# Patient Record
Sex: FEMALE | Race: WHITE | NOT HISPANIC OR LATINO | Employment: FULL TIME | URBAN - METROPOLITAN AREA
[De-identification: names, ages, dates, MRNs, and addresses within clinical notes are randomized per-mention and may not be internally consistent; named-entity substitution may affect disease eponyms.]

---

## 2017-12-02 ENCOUNTER — APPOINTMENT (OUTPATIENT)
Dept: LAB | Facility: HOSPITAL | Age: 37
End: 2017-12-02
Attending: OBSTETRICS & GYNECOLOGY
Payer: COMMERCIAL

## 2017-12-02 ENCOUNTER — TRANSCRIBE ORDERS (OUTPATIENT)
Dept: LAB | Facility: HOSPITAL | Age: 37
End: 2017-12-02

## 2017-12-02 DIAGNOSIS — N92.6 IRREGULAR MENSTRUAL CYCLE: Primary | ICD-10-CM

## 2017-12-02 DIAGNOSIS — N92.6 IRREGULAR MENSTRUAL CYCLE: ICD-10-CM

## 2017-12-02 LAB
B-HCG SERPL-ACNC: <2 MIU/ML
BASOPHILS # BLD AUTO: 0.03 THOUSANDS/ΜL (ref 0–0.1)
BASOPHILS NFR BLD AUTO: 0 % (ref 0–1)
EOSINOPHIL # BLD AUTO: 0.13 THOUSAND/ΜL (ref 0–0.61)
EOSINOPHIL NFR BLD AUTO: 2 % (ref 0–6)
ERYTHROCYTE [DISTWIDTH] IN BLOOD BY AUTOMATED COUNT: 13 % (ref 11.6–15.1)
HCT VFR BLD AUTO: 37.5 % (ref 34.8–46.1)
HGB BLD-MCNC: 12.5 G/DL (ref 11.5–15.4)
LYMPHOCYTES # BLD AUTO: 2.22 THOUSANDS/ΜL (ref 0.6–4.47)
LYMPHOCYTES NFR BLD AUTO: 32 % (ref 14–44)
MCH RBC QN AUTO: 30.3 PG (ref 26.8–34.3)
MCHC RBC AUTO-ENTMCNC: 33.3 G/DL (ref 31.4–37.4)
MCV RBC AUTO: 91 FL (ref 82–98)
MONOCYTES # BLD AUTO: 0.59 THOUSAND/ΜL (ref 0.17–1.22)
MONOCYTES NFR BLD AUTO: 9 % (ref 4–12)
NEUTROPHILS # BLD AUTO: 3.92 THOUSANDS/ΜL (ref 1.85–7.62)
NEUTS SEG NFR BLD AUTO: 57 % (ref 43–75)
NRBC BLD AUTO-RTO: 0 /100 WBCS
PLATELET # BLD AUTO: 235 THOUSANDS/UL (ref 149–390)
PMV BLD AUTO: 10.7 FL (ref 8.9–12.7)
RBC # BLD AUTO: 4.12 MILLION/UL (ref 3.81–5.12)
TSH SERPL DL<=0.05 MIU/L-ACNC: 4.06 UIU/ML (ref 0.36–3.74)
WBC # BLD AUTO: 6.9 THOUSAND/UL (ref 4.31–10.16)

## 2017-12-02 PROCEDURE — 84443 ASSAY THYROID STIM HORMONE: CPT

## 2017-12-02 PROCEDURE — 85025 COMPLETE CBC W/AUTO DIFF WBC: CPT

## 2017-12-02 PROCEDURE — 84702 CHORIONIC GONADOTROPIN TEST: CPT

## 2017-12-02 PROCEDURE — 36415 COLL VENOUS BLD VENIPUNCTURE: CPT

## 2017-12-20 ENCOUNTER — ALLSCRIPTS OFFICE VISIT (OUTPATIENT)
Dept: OTHER | Facility: OTHER | Age: 37
End: 2017-12-20

## 2017-12-20 DIAGNOSIS — R94.6 ABNORMAL RESULTS OF THYROID FUNCTION STUDIES: ICD-10-CM

## 2017-12-20 DIAGNOSIS — N92.6 IRREGULAR MENSTRUATION: ICD-10-CM

## 2017-12-20 DIAGNOSIS — R53.83 OTHER FATIGUE: ICD-10-CM

## 2017-12-21 NOTE — PROGRESS NOTES
Assessment   1  Elevated TSH (794 5) (R94 6)   2  Irregular periods (626 4) (N92 6)   3  Fatigue (780 79) (R53 83)   4  Palpitations (785 1) (R00 2)    Plan   Elevated TSH, Fatigue, Irregular periods    · (1) COMPREHENSIVE METABOLIC PANEL; Status:Active; Requested for:15Bdf8466;    · (1) T4, FREE; Status:Active; Requested for:56Kkm1937;    · (1) TSH; Status:Active; Requested for:55Vcm2871;    · Follow-up PRN Evaluation and Treatment  Follow-up  Status: Complete  Done:    [id number]    Discussion/Summary      Recheck labs as directed and check cmp and TSH and T4 free for recheck of possible hypothyroidism with complaints of fatigue and also palpitations and also recent irregular period  Avoid caffeine for hx of some palpitations previously, none today or recently  Call if any problems  If TSH still elevated and continues with same symptoms will start Levothyroxine  Await labs due for 1st week of January 2018  The patient was counseled regarding  The treatment plan was reviewed with the patient/guardian  The patient/guardian understands and agrees with the treatment plan      Chief Complaint   Pt here to review TSH blood work  No other concerns  History of Present Illness   Mother recently diagnosed with hypothyroidism  No weight gain or constipation but has had fatigue and also cold hands and also irregular periods  Did get her period in December, 2018  pregnancy test negative  Did see her GYN and ordered TSH which was borderline elevated  No other complaints  Has had some palpitations also recently  Does drink coffee  She also noted some low back pain for 1 1/2 weeks which resolved recently  Review of Systems        Constitutional: as noted in HPI  Eyes: No complaints of eye pain, no red eyes, no eyesight problems, no discharge, no dry eyes, no itching of eyes  ENT: no complaints of earache, no loss of hearing, no nose bleeds, no nasal discharge, no sore throat, no hoarseness  Cardiovascular: as noted in HPI  Respiratory: No complaints of shortness of breath, no wheezing, no cough, no SOB on exertion, no orthopnea, no PND  Gastrointestinal: No complaints of abdominal pain, no constipation, no nausea or vomiting, no diarrhea, no bloody stools  Genitourinary: as noted in HPI  Musculoskeletal: No complaints of arthralgias, no myalgias, no joint swelling or stiffness, no limb pain or swelling  Integumentary: No complaints of skin rash or lesions, no itching, no skin wounds, no breast pain or lump  Neurological: No complaints of headache, no confusion, no convulsions, no numbness, no dizziness or fainting, no tingling, no limb weakness, no difficulty walking  Psychiatric: Not suicidal, no sleep disturbance, no anxiety or depression, no change in personality, no emotional problems  Endocrine: as noted in HPI  Hematologic/Lymphatic: No complaints of swollen glands, no swollen glands in the neck, does not bleed easily, does not bruise easily  Active Problems   1  Chronic Common Migraine (Without Aura) With Status Migrainosus (346 72)   2  Depression (311) (F32 9)   3  Dysfunction of left eustachian tube (381 81) (H69 82)   4  Dysfunction of right eustachian tube (381 81) (H69 81)   5  Esophageal reflux (530 81) (K21 9)   6  History of allergy (V15 09) (Z88 9)   7  Migraine (346 90) (G43 909)   8  Nausea and vomiting (787 01) (R11 2)   9  Nonspecific reaction to tuberculin skin test without active tuberculosis (795 51) (R76 11)   10  Sinusitis (473 9) (J32 9)    Past Medical History   1  History of eczema (V13 3) (Z87 2)    Surgical History   1  History of Appendectomy   2  History of Primary Repair Of Knee Ligament Cruciate Anterior   3  History of Tonsillectomy    Social History    · Denied: History of Alcohol Use (History)   · Denied: History of Drug Use   · Never A Smoker    Current Meds    1   Butalbital-APAP-Caffeine -40 MG Oral Tablet; TAKE 1 TABLET Daily 1 po qd prn     headaches; Therapy: 35GPT6820 to (Evaluate:40Fmn5418)  Requested for: 14PZJ3127; Last     YW:28AHF5981 Ordered   2  Promethazine HCl - 25 MG Oral Tablet; TAKE 1 TABLET Daily PRN nausea,vomiting; Therapy: 85FXH9136 to (Evaluate:17Jun2016)  Requested for: 21LXL3254; Last     Rx:02Jun2016 Ordered    Allergies   1  No Known Drug Allergies    Vitals   Vital Signs    Recorded: 99VBK7819 22:45BS   Systolic 949   Diastolic 68   Height 5 ft 8 in   Weight 161 lb 4 oz   BMI Calculated 24 52   BSA Calculated 1 87     Physical Exam        Constitutional      General appearance: Abnormal  -- fatigue  Eyes      Conjunctiva and lids: No swelling, erythema or discharge  Pupils and irises: Equal, round and reactive to light  Ears, Nose, Mouth, and Throat      External inspection of ears and nose: Normal        Otoscopic examination: Tympanic membranes translucent with normal light reflex  Canals patent without erythema  Nasal mucosa, septum, and turbinates: Normal without edema or erythema  Oropharynx: Normal with no erythema, edema, exudate or lesions  Pulmonary      Respiratory effort: No increased work of breathing or signs of respiratory distress  Auscultation of lungs: Clear to auscultation  Cardiovascular      Palpation of heart: Normal PMI, no thrills  Auscultation of heart: Normal rate and rhythm, normal S1 and S2, without murmurs  Examination of extremities for edema and/or varicosities: Normal        Carotid pulses: Normal        Lymphatic      Palpation of lymph nodes in neck: No lymphadenopathy  Musculoskeletal      Gait and station: Normal        Digits and nails: Normal without clubbing or cyanosis  Inspection/palpation of joints, bones, and muscles: Normal        Skin      Skin and subcutaneous tissue: Normal without rashes or lesions  Neurologic      Cranial nerves: Cranial nerves 2-12 intact  Reflexes: 2+ and symmetric  Sensation: No sensory loss  Psychiatric      Orientation to person, place, and time: Normal        Mood and affect: Normal        Additional Exam:  no goiter, no exophthalmos, no edema           Signatures    Electronically signed by : Heath Swann DO; Dec 20 2017  9:45AM EST                       (Author)

## 2018-01-15 NOTE — RESULT NOTES
Message   MRI brain wnl  Verified Results  * MRI BRAIN WO CONTRAST [id number] 06:25AM [facility] Order Number: [id number]     Test Name Result Flag Reference   MRI BRAIN WO CONTRAST (Report)     MRI BRAIN WITHOUT CONTRAST     INDICATION: Migraine headaches for years becoming more frequent, approximately every 2 weeks  COMPARISON:  Prior MRI June 26, 2006     TECHNIQUE: Sagittal T1, axial T2, axial FLAIR, axial T1, axial Gradient and axial diffusion imaging  Axial bravo  IMAGE QUALITY: Diagnostic  FINDINGS:     BRAIN PARENCHYMA: There is no discrete mass, mass effect or midline shift  No abnormal white matter signal identified  Brainstem and cerebellum demonstrate normal signal  There is no intracranial hemorrhage  There is no evidence of acute infarction and   diffusion imaging is unremarkable  VENTRICLES: The ventricles are normal in size and contour  SELLA AND PITUITARY GLAND: Normal      ORBITS: Normal      PARANASAL SINUSES: Normal      VASCULATURE: Evaluation of the major intracranial vasculature demonstrates appropriate flow voids  CALVARIUM AND SKULL BASE: Normal      EXTRACRANIAL SOFT TISSUES: Normal        IMPRESSION:     Normal MRI of the brain         Workstation performed: [id number]     Signed by:   Javier Allen DO   6/9/16

## 2018-01-20 ENCOUNTER — TRANSCRIBE ORDERS (OUTPATIENT)
Dept: LAB | Facility: HOSPITAL | Age: 38
End: 2018-01-20

## 2018-01-20 ENCOUNTER — APPOINTMENT (OUTPATIENT)
Dept: LAB | Facility: HOSPITAL | Age: 38
End: 2018-01-20
Payer: COMMERCIAL

## 2018-01-20 DIAGNOSIS — R94.6 ABNORMAL RESULTS OF THYROID FUNCTION STUDIES: ICD-10-CM

## 2018-01-20 DIAGNOSIS — N92.6 IRREGULAR MENSTRUATION: ICD-10-CM

## 2018-01-20 DIAGNOSIS — R53.83 OTHER FATIGUE: ICD-10-CM

## 2018-01-20 LAB
ALBUMIN SERPL BCP-MCNC: 4.2 G/DL (ref 3.5–5)
ALP SERPL-CCNC: 58 U/L (ref 46–116)
ALT SERPL W P-5'-P-CCNC: 21 U/L (ref 12–78)
ANION GAP SERPL CALCULATED.3IONS-SCNC: 7 MMOL/L (ref 4–13)
AST SERPL W P-5'-P-CCNC: 17 U/L (ref 5–45)
BILIRUB SERPL-MCNC: 0.56 MG/DL (ref 0.2–1)
BUN SERPL-MCNC: 8 MG/DL (ref 5–25)
CALCIUM SERPL-MCNC: 8.9 MG/DL (ref 8.3–10.1)
CHLORIDE SERPL-SCNC: 105 MMOL/L (ref 100–108)
CO2 SERPL-SCNC: 26 MMOL/L (ref 21–32)
CREAT SERPL-MCNC: 0.79 MG/DL (ref 0.6–1.3)
GFR SERPL CREATININE-BSD FRML MDRD: 96 ML/MIN/1.73SQ M
GLUCOSE SERPL-MCNC: 90 MG/DL (ref 65–140)
POTASSIUM SERPL-SCNC: 3.9 MMOL/L (ref 3.5–5.3)
PROT SERPL-MCNC: 7.3 G/DL (ref 6.4–8.2)
SODIUM SERPL-SCNC: 138 MMOL/L (ref 136–145)
T4 FREE SERPL-MCNC: 1.01 NG/DL (ref 0.76–1.46)
TSH SERPL DL<=0.05 MIU/L-ACNC: 3.52 UIU/ML (ref 0.36–3.74)

## 2018-01-20 PROCEDURE — 84439 ASSAY OF FREE THYROXINE: CPT

## 2018-01-20 PROCEDURE — 36415 COLL VENOUS BLD VENIPUNCTURE: CPT

## 2018-01-20 PROCEDURE — 80053 COMPREHEN METABOLIC PANEL: CPT

## 2018-01-20 PROCEDURE — 84443 ASSAY THYROID STIM HORMONE: CPT

## 2018-01-21 ENCOUNTER — GENERIC CONVERSION - ENCOUNTER (OUTPATIENT)
Dept: OTHER | Facility: OTHER | Age: 38
End: 2018-01-21

## 2018-01-23 VITALS
BODY MASS INDEX: 24.44 KG/M2 | DIASTOLIC BLOOD PRESSURE: 68 MMHG | WEIGHT: 161.25 LBS | SYSTOLIC BLOOD PRESSURE: 118 MMHG | HEIGHT: 68 IN

## 2018-01-24 NOTE — RESULT NOTES
Verified Results  (1) TSH 20Jan2018 07:40AM [facility] Order Number: [id number]     Test Name Result Flag Reference   TSH 3 520 uIU/mL  0 358-3 740   Patients undergoing fluorescein dye angiography may retain small amounts of fluorescein in the body for 48-72 hours post procedure  Samples containing fluorescein can produce falsely depressed TSH values  If the patient had this procedure,a specimen should be resubmitted post fluorescein clearance  The recommended reference ranges for TSH during pregnancy are as follows:  First trimester 0 1 to 2 5 uIU/mL  Second trimester  0 2 to 3 0 uIU/mL  Third trimester 0 3 to 3 0 uIU/m     (1) T4, FREE 20Jan2018 07:40AM [facility] Order Number: [id number]     Test Name Result Flag Reference   T4,FREE 1 01 ng/dL  0 76-1 46   Specimen collection should occur prior to Sulfasalazine administration due to the potential for falsely elevated results  (1) COMPREHENSIVE METABOLIC PANEL 38VUY0663 87:86DG [facility] Order Number: [id number]     Test Name Result Flag Reference   GLUCOSE,RANDM 90 mg/dL     If the patient is fasting, the ADA then defines impaired fasting glucose as > 100 mg/dL and diabetes as > or equal to 123 mg/dL  Specimen collection should occur prior to Sulfasalazine administration due to the potential for falsely depressed results  Specimen collection should occur prior to Sulfapyridine administration due to the potential for falsely elevated results     SODIUM 138 mmol/L  136-145   POTASSIUM 3 9 mmol/L  3 5-5 3   CHLORIDE 105 mmol/L  100-108   CARBON DIOXIDE 26 mmol/L  21-32   ANION GAP (CALC) 7 mmol/L  4-13   BLOOD UREA NITROGEN 8 mg/dL  5-25   CREATININE 0 79 mg/dL  0 60-1 30   Standardized to IDMS reference method   CALCIUM 8 9 mg/dL  8 3-10 1   BILI, TOTAL 0 56 mg/dL  0 20-1 00   ALK PHOSPHATAS 58 U/L     ALT (SGPT) 21 U/L  12-78   Specimen collection should occur prior to Sulfasalazine and/or Sulfapyridine administration due to the potential for falsely depressed results  AST(SGOT) 17 U/L  5-45   Specimen collection should occur prior to Sulfasalazine administration due to the potential for falsely depressed results  ALBUMIN 4 2 g/dL  3 5-5 0   TOTAL PROTEIN 7 3 g/dL  6 4-8 2   eGFR 96 ml/min/1 73sq m     [facility] Disease Education Program recommendations are as follows:  GFR calculation is accurate only with a steady state creatinine  Chronic Kidney disease less than 60 ml/min/1 73 sq  meters  Kidney failure less than 15 ml/min/1 73 sq  meters

## 2018-06-19 ENCOUNTER — OFFICE VISIT (OUTPATIENT)
Dept: FAMILY MEDICINE CLINIC | Facility: CLINIC | Age: 38
End: 2018-06-19
Payer: COMMERCIAL

## 2018-06-19 VITALS
SYSTOLIC BLOOD PRESSURE: 100 MMHG | WEIGHT: 157.4 LBS | DIASTOLIC BLOOD PRESSURE: 64 MMHG | HEIGHT: 68 IN | TEMPERATURE: 98.4 F | BODY MASS INDEX: 23.86 KG/M2

## 2018-06-19 DIAGNOSIS — J32.9 SINUSITIS, UNSPECIFIED CHRONICITY, UNSPECIFIED LOCATION: Primary | ICD-10-CM

## 2018-06-19 DIAGNOSIS — R05.9 COUGH: ICD-10-CM

## 2018-06-19 PROCEDURE — 3008F BODY MASS INDEX DOCD: CPT | Performed by: FAMILY MEDICINE

## 2018-06-19 PROCEDURE — 1036F TOBACCO NON-USER: CPT | Performed by: FAMILY MEDICINE

## 2018-06-19 PROCEDURE — 99213 OFFICE O/P EST LOW 20 MIN: CPT | Performed by: FAMILY MEDICINE

## 2018-06-19 RX ORDER — FLUTICASONE PROPIONATE 50 MCG
2 SPRAY, SUSPENSION (ML) NASAL DAILY
Qty: 16 G | Refills: 1 | Status: SHIPPED | OUTPATIENT
Start: 2018-06-19 | End: 2020-03-09

## 2018-06-19 RX ORDER — MULTIVITAMIN
1 TABLET ORAL DAILY
COMMUNITY

## 2018-06-19 RX ORDER — LANOLIN ALCOHOL/MO/W.PET/CERES
CREAM (GRAM) TOPICAL DAILY
COMMUNITY
End: 2020-03-09

## 2018-06-19 RX ORDER — PREDNISONE 10 MG/1
TABLET ORAL
Qty: 21 TABLET | Refills: 0 | Status: SHIPPED | OUTPATIENT
Start: 2018-06-19 | End: 2020-03-09

## 2018-06-19 RX ORDER — AZITHROMYCIN 250 MG/1
TABLET, FILM COATED ORAL
Qty: 6 TABLET | Refills: 0 | Status: SHIPPED | OUTPATIENT
Start: 2018-06-19 | End: 2018-06-24

## 2018-06-19 NOTE — PROGRESS NOTES
Assessment/Plan:  Chief Complaint   Patient presents with    Nasal Congestion     had symptoms for about 1 week now    Earache    Sinusitis    Cough     Patient Instructions   Rest and fluids, start abx and prednisone as directed and nasal steroid and call if worse  May use Dimetapp DM cold and cough prn cough  No problem-specific Assessment & Plan notes found for this encounter  Diagnoses and all orders for this visit:    Sinusitis, unspecified chronicity, unspecified location  -     fluticasone (FLONASE) 50 mcg/act nasal spray; 2 sprays into each nostril daily  -     predniSONE 10 mg tablet; Take 60 mg po day#1, 50 mg po day#2, 40 mg po day#3, 30 mg po day#4, 20 mg po day#5m and 10 mg po day#6    Cough  -     azithromycin (ZITHROMAX) 250 mg tablet; Take 2 tablets today then 1 tablet daily x 4 days  -     predniSONE 10 mg tablet; Take 60 mg po day#1, 50 mg po day#2, 40 mg po day#3, 30 mg po day#4, 20 mg po day#5m and 10 mg po day#6    Other orders  -     cyanocobalamin (VITAMIN B-12) 1,000 mcg tablet; Take by mouth daily  -     Multiple Vitamin (MULTIVITAMIN) tablet; Take 1 tablet by mouth daily  -     Calcium Carb-Cholecalciferol (CALCIUM 500+D PO); Take by mouth          Subjective:      Patient ID: Frank Alvares is a 40 y o  female  Nasal Congestion (had symptoms for about 1 week now)  Earache   Sinusitis   Cough     Here for 1 week duration of symptoms, took Tylenol prn pain and motrin also and used cold and sinus OTC med  No fever or chills  The following portions of the patient's history were reviewed and updated as appropriate: allergies, current medications, past family history, past medical history, past social history, past surgical history and problem list     Review of Systems   Constitutional: Negative  HENT: Positive for congestion, ear pain, sinus pain and sinus pressure  Eyes: Negative  Respiratory: Positive for cough  Cardiovascular: Negative  Gastrointestinal: Negative  Endocrine: Negative  Genitourinary: Negative  Musculoskeletal: Negative  Skin: Negative  Allergic/Immunologic: Negative  Neurological: Negative  Hematological: Negative  Psychiatric/Behavioral: Negative  Objective:      /64 (BP Location: Left arm, Patient Position: Sitting, Cuff Size: Standard)   Temp 98 4 °F (36 9 °C) (Tympanic)   Ht 5' 8" (1 727 m)   Wt 71 4 kg (157 lb 6 4 oz)   BMI 23 93 kg/m²          Physical Exam   Constitutional: She is oriented to person, place, and time  She appears well-developed and well-nourished  HENT:   Head: Normocephalic and atraumatic  Right Ear: External ear normal    Left Ear: External ear normal    Pnd, sinusitis, nasal congestion   Eyes: Conjunctivae and EOM are normal  Pupils are equal, round, and reactive to light  Neck: Normal range of motion  Neck supple  Cardiovascular: Normal rate, regular rhythm, normal heart sounds and intact distal pulses  Pulmonary/Chest: Effort normal and breath sounds normal    cough   Musculoskeletal: Normal range of motion  Neurological: She is alert and oriented to person, place, and time  She has normal reflexes  Skin: Skin is warm and dry  Psychiatric: She has a normal mood and affect   Her behavior is normal

## 2018-06-19 NOTE — PATIENT INSTRUCTIONS
Rest and fluids, start abx and prednisone as directed and nasal steroid and call if worse  May use Dimetapp DM cold and cough prn cough

## 2019-02-26 ENCOUNTER — CONSULT (OUTPATIENT)
Dept: SURGICAL ONCOLOGY | Facility: CLINIC | Age: 39
End: 2019-02-26
Payer: COMMERCIAL

## 2019-02-26 VITALS
DIASTOLIC BLOOD PRESSURE: 70 MMHG | HEART RATE: 72 BPM | WEIGHT: 168 LBS | HEIGHT: 68 IN | TEMPERATURE: 96.5 F | BODY MASS INDEX: 25.46 KG/M2 | SYSTOLIC BLOOD PRESSURE: 110 MMHG

## 2019-02-26 DIAGNOSIS — R92.8 ABNORMAL FINDING ON BREAST IMAGING: Primary | ICD-10-CM

## 2019-02-26 DIAGNOSIS — N63.0 BREAST MASS: ICD-10-CM

## 2019-02-26 PROCEDURE — 99243 OFF/OP CNSLTJ NEW/EST LOW 30: CPT | Performed by: SURGERY

## 2019-02-26 RX ORDER — BUTALBITAL, ACETAMINOPHEN AND CAFFEINE 50; 325; 40 MG/1; MG/1; MG/1
1 TABLET ORAL EVERY 4 HOURS PRN
COMMUNITY
End: 2020-03-09

## 2019-02-26 NOTE — PROGRESS NOTES
Breast Consultation-Surgical Oncology     [facility]  [facility] [place]  [place]    Name:  Danielito Riley  YOB: 1980  MRN:  [id number]    Assessment/Plan   Diagnoses and all orders for this visit:    Abnormal finding on breast imaging  -     US breast left limited (diagnostic); Future    Breast mass      HPI: Danielito Riley is a 45y o  year old female who presents with a left breast lump  She feels a lump in her upper breast which she feels has grown in size  It is not painful, she only feels mild discomfort if pressure is applied to the area  She denies nipple discharge or skin changes  Surgical treatment to date consisted of n/a  Oncology History:     No history exists  Pertinent reproductive history:  Age at menarche:  15  OB History        3    Para   4    Term                AB        Living           SAB        TAB        Ectopic        Multiple        Live Births               Obstetric Comments   Menarche : 15  Age at first pregnancy 25  Hx of BCP x 12 years              Age at first live birth:  25  Age at menopause:  n/a  Hysterectomy/Oophrectomy:  No  Hormone replacement therapy:  No  Birth control pills:  Yes    Problem List:   There is no problem list on file for this patient      Past Medical History:   Diagnosis Date    Breast mass     left beast    Headache      Past Surgical History:   Procedure Laterality Date    APPENDECTOMY      BREAST BIOPSY Left 12 years ago    KNEE ARTHROSCOPY W/ ACL RECONSTRUCTION      TONSILLECTOMY       Family History   Problem Relation Age of Onset    Prostate cancer Maternal Grandfather 79    Lung cancer Maternal Grandfather 79     Social History     Socioeconomic History    Marital status: /Civil Union     Spouse name: Not on file    Number of children: Not on file    Years of education: Not on file    Highest education level: Not on file Occupational History    Not on file   Social Needs    Financial resource strain: Not on file    Food insecurity:     Worry: Not on file     Inability: Not on file    Transportation needs:     Medical: Not on file     Non-medical: Not on file   Tobacco Use    Smoking status: Never Smoker    Smokeless tobacco: Never Used   Substance and Sexual Activity    Alcohol use: Yes     Frequency: 2-3 times a week     Comment: soically    Drug use: No    Sexual activity: Not on file   Lifestyle    Physical activity:     Days per week: Not on file     Minutes per session: Not on file    Stress: Not on file   Relationships    Social connections:     Talks on phone: Not on file     Gets together: Not on file     Attends Taoist service: Not on file     Active member of club or organization: Not on file     Attends meetings of clubs or organizations: Not on file     Relationship status: Not on file    Intimate partner violence:     Fear of current or ex partner: Not on file     Emotionally abused: Not on file     Physically abused: Not on file     Forced sexual activity: Not on file   Other Topics Concern    Not on file   Social History Narrative    Not on file     Current Outpatient Medications   Medication Sig Dispense Refill    butalbital-acetaminophen-caffeine (FIORICET,ESGIC) -40 mg per tablet Take 1 tablet by mouth every 4 (four) hours as needed for headaches      Calcium Carb-Cholecalciferol (CALCIUM 500+D PO) Take by mouth      Multiple Vitamin (MULTIVITAMIN) tablet Take 1 tablet by mouth daily      cyanocobalamin (VITAMIN B-12) 1,000 mcg tablet Take by mouth daily      fluticasone (FLONASE) 50 mcg/act nasal spray 2 sprays into each nostril daily (Patient not taking: Reported on 2/26/2019) 16 g 1    predniSONE 10 mg tablet Take 60 mg po day#1, 50 mg po day#2, 40 mg po day#3, 30 mg po day#4, 20 mg po day#5m and 10 mg po day#6 (Patient not taking: Reported on 2/26/2019) 21 tablet 0     No current facility-administered medications for this visit  No Known Allergies      The following portions of the patient's history were reviewed and updated as appropriate: allergies, current medications, past family history, past medical history, past social history, past surgical history and problem list     Review of Systems:  Review of Systems   Constitutional: Negative  Negative for appetite change and fever  Eyes: Negative  Respiratory: Negative for shortness of breath  Cardiovascular: Negative  Gastrointestinal: Negative  Endocrine: Negative  Genitourinary: Negative  Musculoskeletal: Negative  Negative for arthralgias and myalgias  Skin: Negative  Allergic/Immunologic: Negative  Neurological: Positive for headaches  Hematological: Negative  Negative for adenopathy  Does not bruise/bleed easily  Psychiatric/Behavioral: Negative  Physical Exam:  Physical Exam   Constitutional: She is oriented to person, place, and time  She appears well-developed and well-nourished  HENT:   Head: Normocephalic and atraumatic  Pulmonary/Chest: Right breast exhibits no inverted nipple, no mass, no nipple discharge, no skin change and no tenderness  Left breast exhibits mass (Small, mobile at 1200 hours)  Left breast exhibits no inverted nipple, no nipple discharge, no skin change and no tenderness  Lymphadenopathy:        Right axillary: No pectoral and no lateral adenopathy present  Left axillary: No pectoral and no lateral adenopathy present  Right: No supraclavicular adenopathy present  Left: No supraclavicular adenopathy present  Neurological: She is alert and oriented to person, place, and time  Psychiatric: She has a normal mood and affect             Imagin17 Bilateral diagnostic Mammogram and left breast ultrasound B3 (3) 1 2 cm hypoechoic mass for which a short-term follow-up was recommended  19 left breast dx mammogram/US  B3 (3) reveals an increase in size of the lesion at 1200 hours now 1 7 cm, this was previously 1 2 cm; there was an additional lesion 9 mm at 1000 hours as well as an area of asymmetric tissue lateral in the breast; a short-term follow-up was recommended        Discussion/Summary:  43-year-old female with a 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   This has increased slightly over a year and a half from 1 2-1 7 cm  This is small, mobile and nontender on examination today  She has additional lesions that were seen in the left breast on her recent ultrasound as well which have a benign appearance  Recommendation was made for a six month follow-up  She reports tenderness in the mass but no real pain  We discussed the options of surgical excision versus short-term follow-up  Given the increase in size over the time frame of only 5 mm, I am agreeable with the six month follow-up  She would like to proceed in this fashion as well  I will give her a requisition slip for the ultrasound for July  I will see her again in six months to review these images and for another exam   I advised her to return sooner should the need arise

## 2019-02-26 NOTE — LETTER
2019     Robert Sharp DO  [address] [facility] [address]    Patient: Lester Carreon   YOB: 1980   Date of Visit: 2019       Dear Dr Stefany Rankin: Thank you for referring [facility] to me for evaluation  Below are my notes for this consultation  If you have questions, please do not hesitate to call me  I look forward to following your patient along with you  Sincerely,        Tina Bautista MD        CC: No Recipients  Tina Bautista MD  2019  9:54 AM  Sign at close encounter    Breast Consultation-Surgical Oncology     [address]  [address]  [address]    Name:  Lester Carreon  YOB: 1980  MRN:  [id number]    Assessment/Plan   Diagnoses and all orders for this visit:    Abnormal finding on breast imaging  -     US breast left limited (diagnostic); Future    Breast mass      HPI: Lester Carreon is a 45y o  year old female who presents with a left breast lump  She feels a lump in her upper breast which she feels has grown in size  It is not painful, she only feels mild discomfort if pressure is applied to the area  She denies nipple discharge or skin changes  Surgical treatment to date consisted of n/a  Oncology History:     No history exists  Pertinent reproductive history:  Age at menarche:  15  OB History        3    Para   4    Term                AB        Living           SAB        TAB        Ectopic        Multiple        Live Births               Obstetric Comments   Menarche : 15  Age at first pregnancy 25  Hx of BCP x 12 years              Age at first live birth:  25  Age at menopause:  n/a  Hysterectomy/Oophrectomy:  No  Hormone replacement therapy:  No  Birth control pills:  Yes    Problem List:   There is no problem list on file for this patient      Past Medical History:   Diagnosis Date    Breast mass     left beast    Headache      Past Surgical History:   Procedure Laterality Date    APPENDECTOMY      BREAST BIOPSY Left 12 years ago    KNEE ARTHROSCOPY W/ ACL RECONSTRUCTION      TONSILLECTOMY       Family History   Problem Relation Age of Onset    Prostate cancer Maternal Grandfather 79    Lung cancer Maternal Grandfather 79     Social History     Socioeconomic History    Marital status: /Civil Union     Spouse name: Not on file    Number of children: Not on file    Years of education: Not on file    Highest education level: Not on file   Occupational History    Not on file   Social Needs    Financial resource strain: Not on file    Food insecurity:     Worry: Not on file     Inability: Not on file    Transportation needs:     Medical: Not on file     Non-medical: Not on file   Tobacco Use    Smoking status: Never Smoker    Smokeless tobacco: Never Used   Substance and Sexual Activity    Alcohol use: Yes     Frequency: 2-3 times a week     Comment: soically    Drug use: No    Sexual activity: Not on file   Lifestyle    Physical activity:     Days per week: Not on file     Minutes per session: Not on file    Stress: Not on file   Relationships    Social connections:     Talks on phone: Not on file     Gets together: Not on file     Attends Buddhism service: Not on file     Active member of club or organization: Not on file     Attends meetings of clubs or organizations: Not on file     Relationship status: Not on file    Intimate partner violence:     Fear of current or ex partner: Not on file     Emotionally abused: Not on file     Physically abused: Not on file     Forced sexual activity: Not on file   Other Topics Concern    Not on file   Social History Narrative    Not on file     Current Outpatient Medications   Medication Sig Dispense Refill    butalbital-acetaminophen-caffeine (FIORICET,ESGIC) -40 mg per tablet Take 1 tablet by mouth every 4 (four) hours as needed for headaches      Calcium Carb-Cholecalciferol (CALCIUM 500+D PO) Take by mouth      Multiple Vitamin (MULTIVITAMIN) tablet Take 1 tablet by mouth daily      cyanocobalamin (VITAMIN B-12) 1,000 mcg tablet Take by mouth daily      fluticasone (FLONASE) 50 mcg/act nasal spray 2 sprays into each nostril daily (Patient not taking: Reported on 2/26/2019) 16 g 1    predniSONE 10 mg tablet Take 60 mg po day#1, 50 mg po day#2, 40 mg po day#3, 30 mg po day#4, 20 mg po day#5m and 10 mg po day#6 (Patient not taking: Reported on 2/26/2019) 21 tablet 0     No current facility-administered medications for this visit  No Known Allergies      The following portions of the patient's history were reviewed and updated as appropriate: allergies, current medications, past family history, past medical history, past social history, past surgical history and problem list     Review of Systems:  Review of Systems   Constitutional: Negative  Negative for appetite change and fever  Eyes: Negative  Respiratory: Negative for shortness of breath  Cardiovascular: Negative  Gastrointestinal: Negative  Endocrine: Negative  Genitourinary: Negative  Musculoskeletal: Negative  Negative for arthralgias and myalgias  Skin: Negative  Allergic/Immunologic: Negative  Neurological: Positive for headaches  Hematological: Negative  Negative for adenopathy  Does not bruise/bleed easily  Psychiatric/Behavioral: Negative  Physical Exam:  Physical Exam   Constitutional: She is oriented to person, place, and time  She appears well-developed and well-nourished  HENT:   Head: Normocephalic and atraumatic  Pulmonary/Chest: Right breast exhibits no inverted nipple, no mass, no nipple discharge, no skin change and no tenderness  Left breast exhibits mass (Small, mobile at 1200 hours)  Left breast exhibits no inverted nipple, no nipple discharge, no skin change and no tenderness     Lymphadenopathy: Right axillary: No pectoral and no lateral adenopathy present  Left axillary: No pectoral and no lateral adenopathy present  Right: No supraclavicular adenopathy present  Left: No supraclavicular adenopathy present  Neurological: She is alert and oriented to person, place, and time  Psychiatric: She has a normal mood and affect  [id number] Bilateral diagnostic Mammogram and left breast ultrasound B3 (3) 1 2 cm hypoechoic mass for which a short-term follow-up was recommended  19 left breast dx mammogram/US  B3 (3) reveals an increase in size of the lesion at 1200 hours now 1 7 cm, this was previously 1 2 cm; there was an additional lesion 9 mm at 1000 hours as well as an area of asymmetric tissue lateral in the breast; a short-term follow-up was recommended        Discussion/Summary:  43-year-old female with a 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   This has increased slightly over a year and a half from 1 2-1 7 cm  This is small, mobile and nontender on examination today  She has additional lesions that were seen in the left breast on her recent ultrasound as well which have a benign appearance  Recommendation was made for a six month follow-up  She reports tenderness in the mass but no real pain  We discussed the options of surgical excision versus short-term follow-up  Given the increase in size over the time frame of only 5 mm, I am agreeable with the six month follow-up  She would like to proceed in this fashion as well  I will give her a requisition slip for the ultrasound for July  I will see her again in six months to review these images and for another exam   I advised her to return sooner should the need arise

## 2019-07-22 ENCOUNTER — TRANSCRIBE ORDERS (OUTPATIENT)
Dept: ADMINISTRATIVE | Facility: HOSPITAL | Age: 39
End: 2019-07-22

## 2019-07-22 ENCOUNTER — HOSPITAL ENCOUNTER (OUTPATIENT)
Dept: ULTRASOUND IMAGING | Facility: CLINIC | Age: 39
Discharge: HOME | End: 2019-07-22
Payer: COMMERCIAL

## 2019-07-22 VITALS — HEIGHT: 68 IN | BODY MASS INDEX: 25.46 KG/M2 | WEIGHT: 168 LBS

## 2019-07-22 DIAGNOSIS — R92.8 ABNORMAL FINDING ON BREAST IMAGING: ICD-10-CM

## 2019-07-22 DIAGNOSIS — R92.8 ABNORMAL ULTRASOUND OF BREAST: Primary | ICD-10-CM

## 2019-07-22 PROCEDURE — 76642 ULTRASOUND BREAST LIMITED: CPT

## 2020-01-23 ENCOUNTER — HOSPITAL ENCOUNTER (OUTPATIENT)
Dept: ULTRASOUND IMAGING | Facility: CLINIC | Age: 40
Discharge: HOME | End: 2020-01-23
Payer: COMMERCIAL

## 2020-01-23 VITALS — HEIGHT: 68 IN | BODY MASS INDEX: 25.46 KG/M2 | WEIGHT: 168 LBS

## 2020-01-23 DIAGNOSIS — R92.8 ABNORMAL ULTRASOUND OF BREAST: ICD-10-CM

## 2020-01-23 PROCEDURE — 76642 ULTRASOUND BREAST LIMITED: CPT

## 2020-01-23 NOTE — PROGRESS NOTES
Met with patient and Dr Eliceo Bennett                  regarding recommendation for;      _____ RIGHT ___x___LEFT      __x___Ultrasound guided  ______Stereotactic  Breast biopsy  __x___Verbalized understanding        Blood thinners:  _____yes __x___no    Date stopped: _____n/a______    Biopsy teaching sheet given:  ____x___yes ______no

## 2020-02-04 ENCOUNTER — HOSPITAL ENCOUNTER (OUTPATIENT)
Dept: ULTRASOUND IMAGING | Facility: CLINIC | Age: 40
Discharge: HOME | End: 2020-02-04
Payer: COMMERCIAL

## 2020-02-04 VITALS — HEART RATE: 64 BPM | DIASTOLIC BLOOD PRESSURE: 68 MMHG | SYSTOLIC BLOOD PRESSURE: 112 MMHG

## 2020-02-04 DIAGNOSIS — R92.8 ABNORMAL ULTRASOUND OF BREAST: ICD-10-CM

## 2020-02-04 PROCEDURE — 19083 BX BREAST 1ST LESION US IMAG: CPT

## 2020-02-04 PROCEDURE — 88305 TISSUE EXAM BY PATHOLOGIST: CPT | Performed by: PATHOLOGY

## 2020-02-04 RX ORDER — LIDOCAINE HYDROCHLORIDE 10 MG/ML
5 INJECTION, SOLUTION EPIDURAL; INFILTRATION; INTRACAUDAL; PERINEURAL ONCE
Status: COMPLETED | OUTPATIENT
Start: 2020-02-04 | End: 2020-02-04

## 2020-02-04 RX ADMIN — LIDOCAINE HYDROCHLORIDE 5 ML: 10 INJECTION, SOLUTION EPIDURAL; INFILTRATION; INTRACAUDAL; PERINEURAL at 11:27

## 2020-02-04 NOTE — PROGRESS NOTES
Procedure type:    __x___ultrasound guided _____stereotactic    Breast:    __x___Left _____Right    Location: 12:00 5cm from nipple    Needle: 12g Gris    # of passes: 4    Clip: Securmark-cylinder    Performed by: Dr Liz Nguyen    Pressure held for 5 minutes by: Susana Bellamy Strips:    __x___yes _____no    Band aid:    __x___yes_____no    Tape and guaze:    _____yes __x___no    Tolerated procedure:    __x___yes _____no

## 2020-02-04 NOTE — DISCHARGE INSTR - OTHER ORDERS
POST LARGE CORE BREAST BIOPSY PATIENT INFORMATION      1  Place an ice pack inside your bra over the top of the dressing every hour for 20 minutes (20 minutes on, 60 minutes off)  Do this until bedtime  2  Do not shower or bathe until the following morning  3  You may bathe your breast carefully with the steri-strips in place  Be careful    Not to loosen them  The steri-strips will fall off in 3-5 days  4  You may have mild discomfort, and you may have some bruising where the   Needle entered the skin  This should clear within 5-7 days  5  If you need medicine for discomfort, take acetaminophen products such as   Tylenol  You may also take Advil or Motrin products  6  Do not participate in strenuous activities such as-tennis, aerobics, skiing,  Weight lifting, etc  for 24 hours  Refrain from swimming/soaking for 72 hours  7  Wearing a bra for sleeping may be more comfortable for the first 24-48 hours  8  Watch for continued bleeding, pain or fever over 101; please call with any questions or concerns  For procedures done at the [facility] [address] call:  Lenora Rinaldi RN at [phone]  Fernandez Lipoma RN at [phone]                    *After 4 PM call the Interventional Radiology Department                    [phone] and ask to speak with the nurse on call  For procedures done at the [address] [place] [address] call:         Jhonatan Rincon RN at   *After 4 PM call the Interventional Radiology Department   [phone] and ask to speak with the nurse on call  For procedures done at [address] [place] [address] call: The Radiology Nurse at [phone]  *After 4 PM call your physician, or go to the Emergency Department  9          The final results of your biopsy are usually available within one week

## 2020-02-05 NOTE — PROGRESS NOTES
Post procedure call completed    Bleeding: _____yes __x___no    Pain: _____yes ____x__no    Redness/Swelling: ______yes __x____no    Band aid removed: __x___yes _____no    Steri-Strips intact: __x____yes _____no

## 2020-02-06 ENCOUNTER — TELEPHONE (OUTPATIENT)
Dept: SURGICAL ONCOLOGY | Facility: CLINIC | Age: 40
End: 2020-02-06

## 2020-02-06 NOTE — TELEPHONE ENCOUNTER
----- Message from [place] sent at 2/6/2020  2:28 PM EST -----  Regarding: Results  Pt called regarding receiving her Bx results  Please give her a call back      Thank you

## 2020-02-07 ENCOUNTER — TELEPHONE (OUTPATIENT)
Dept: SURGICAL ONCOLOGY | Facility: CLINIC | Age: 40
End: 2020-02-07

## 2020-02-07 NOTE — TELEPHONE ENCOUNTER
----- Message from Perry Lopez MD sent at 2/6/2020  1:45 PM EST -----  Attempted to call pt with biopsy results and appt but her VM was full and I could not leave a message, no other numbers on communication consent; sent her a message through my chart; issac put her on the schedule already

## 2020-02-07 NOTE — TELEPHONE ENCOUNTER
Pt returned call  She was informed of her benign breast biopsy results and that Dr Wei Oh would like to review these in more detail with her at an appt set aside for her on 02/18/20 at 10:15 AM  She was agreeable to this and understands

## 2020-02-18 ENCOUNTER — OFFICE VISIT (OUTPATIENT)
Dept: SURGICAL ONCOLOGY | Facility: CLINIC | Age: 40
End: 2020-02-18
Payer: COMMERCIAL

## 2020-02-18 VITALS
WEIGHT: 173 LBS | DIASTOLIC BLOOD PRESSURE: 60 MMHG | HEIGHT: 68 IN | TEMPERATURE: 96.6 F | HEART RATE: 53 BPM | BODY MASS INDEX: 26.22 KG/M2 | SYSTOLIC BLOOD PRESSURE: 120 MMHG | RESPIRATION RATE: 16 BRPM

## 2020-02-18 DIAGNOSIS — D24.2 BENIGN TUMOR OF BREAST, LEFT: Primary | ICD-10-CM

## 2020-02-18 PROCEDURE — 99214 OFFICE O/P EST MOD 30 MIN: CPT | Performed by: SURGERY

## 2020-02-18 PROCEDURE — 3008F BODY MASS INDEX DOCD: CPT | Performed by: SURGERY

## 2020-02-18 PROCEDURE — 1036F TOBACCO NON-USER: CPT | Performed by: SURGERY

## 2020-02-18 RX ORDER — CEFAZOLIN SODIUM 1 G/50ML
1000 SOLUTION INTRAVENOUS ONCE
Status: CANCELLED | OUTPATIENT
Start: 2020-03-13 | End: 2020-02-18

## 2020-02-18 NOTE — H&P (VIEW-ONLY)
Surgical Oncology Follow Up       [address] [facility] [place]  [place]  [facility] [address]    Venice Freeman  1980  [id number]  [address] [facility] [place]  [place] [address]    Chief Complaint   Patient presents with    Follow-up     Pt is here to discuss surgery        Assessment/Plan   Diagnoses and all orders for this visit:    Benign tumor of breast, left    Other orders  -     ceFAZolin (ANCEF) IVPB (premix) 1,000 mg        Advance Care Planning/Advance Directives:  Did not discuss  with the patient  Oncology History:     No history exists  History of Present Illness: Follow-up visit secondary to a left breast tumor, reported no changes that she noted  -Interval History:  Recent biopsy    Review of Systems:  Review of Systems   Constitutional: Negative  Negative for appetite change and fever  Eyes: Negative  Respiratory: Negative for shortness of breath  Cardiovascular: Negative  Gastrointestinal: Negative  Endocrine: Negative  Genitourinary: Negative  Musculoskeletal: Negative  Negative for arthralgias and myalgias  Skin: Negative  Allergic/Immunologic: Negative  Neurological: Negative  Hematological: Negative  Negative for adenopathy  Does not bruise/bleed easily  Psychiatric/Behavioral: Negative          Patient Active Problem List   Diagnosis    Abnormal finding on breast imaging    Breast mass    Benign tumor of breast, left     Past Medical History:   Diagnosis Date    Breast mass     left beast    Headache      Past Surgical History:   Procedure Laterality Date    APPENDECTOMY      BREAST BIOPSY Left 12 years ago    benign    KNEE ARTHROSCOPY W/ ACL RECONSTRUCTION      TONSILLECTOMY      US GUIDED BREAST BIOPSY LEFT COMPLETE Left 2/4/2020     Family History   Problem Relation Age of Onset    Prostate cancer Maternal Grandfather 79    Lung cancer Maternal Grandfather 79    No Known Problems Mother     No Known Problems Father     No Known Problems Daughter     No Known Problems Maternal Grandmother     No Known Problems Paternal Grandmother     No Known Problems Paternal Grandfather     No Known Problems Brother     No Known Problems Daughter     No Known Problems Son     No Known Problems Son     No Known Problems Paternal Aunt     Breast cancer Paternal Aunt         63's    No Known Problems Paternal Aunt      Social History     Socioeconomic History    Marital status: /Civil Union     Spouse name: Not on file    Number of children: Not on file    Years of education: Not on file    Highest education level: Not on file   Occupational History    Not on file   Social Needs    Financial resource strain: Not on file    Food insecurity:     Worry: Not on file     Inability: Not on file    Transportation needs:     Medical: Not on file     Non-medical: Not on file   Tobacco Use    Smoking status: Never Smoker    Smokeless tobacco: Never Used   Substance and Sexual Activity    Alcohol use: Yes     Frequency: 2-3 times a week     Comment: soically    Drug use: No    Sexual activity: Not on file   Lifestyle    Physical activity:     Days per week: Not on file     Minutes per session: Not on file    Stress: Not on file   Relationships    Social connections:     Talks on phone: Not on file     Gets together: Not on file     Attends Zoroastrian service: Not on file     Active member of club or organization: Not on file     Attends meetings of clubs or organizations: Not on file     Relationship status: Not on file    Intimate partner violence:     Fear of current or ex partner: Not on file     Emotionally abused: Not on file     Physically abused: Not on file     Forced sexual activity: Not on file   Other Topics Concern    Not on file   Social History Narrative    Not on file       Current Outpatient Medications:     Calcium Carb-Cholecalciferol (CALCIUM 500+D PO), Take by mouth, Disp: , Rfl:     Multiple Vitamin (MULTIVITAMIN) tablet, Take 1 tablet by mouth daily, Disp: , Rfl:     butalbital-acetaminophen-caffeine (FIORICET,ESGIC) -40 mg per tablet, Take 1 tablet by mouth every 4 (four) hours as needed for headaches, Disp: , Rfl:     cyanocobalamin (VITAMIN B-12) 1,000 mcg tablet, Take by mouth daily, Disp: , Rfl:     fluticasone (FLONASE) 50 mcg/act nasal spray, 2 sprays into each nostril daily (Patient not taking: Reported on 2/26/2019), Disp: 16 g, Rfl: 1    predniSONE 10 mg tablet, Take 60 mg po day#1, 50 mg po day#2, 40 mg po day#3, 30 mg po day#4, 20 mg po day#5m and 10 mg po day#6 (Patient not taking: Reported on 2/26/2019), Disp: 21 tablet, Rfl: 0  No Known Allergies    The following portions of the patient's history were reviewed and updated as appropriate: allergies, current medications, past family history, past medical history, past social history, past surgical history and problem list         Vitals:    02/18/20 1031   BP: 120/60   Pulse: (!) 53   Resp: 16   Temp: (!) 96 6 °F (35 9 °C)       Physical Exam   Constitutional: She is oriented to person, place, and time  She appears well-developed and well-nourished  HENT:   Head: Normocephalic and atraumatic  Cardiovascular: Normal heart sounds  Pulmonary/Chest: Breath sounds normal  Right breast exhibits no inverted nipple, no mass, no nipple discharge, no skin change and no tenderness  Left breast exhibits mass (Prominent nodularity left 12-1 o'clock axis, 5 cm from the nipple) and skin change (Well healing biopsy site with resolving ecchymosis upper left breast)  Left breast exhibits no inverted nipple, no nipple discharge and no tenderness  Abdominal: Soft  Lymphadenopathy:        Right axillary: No pectoral and no lateral adenopathy present  Left axillary: No pectoral and no lateral adenopathy present         Right: No supraclavicular adenopathy present  Left: No supraclavicular adenopathy present  Neurological: She is alert and oriented to person, place, and time  Psychiatric: She has a normal mood and affect  Results:  Labs:  02/04/2020 core biopsy left breast 1200 hours reveals a cellular fibroepithelial lesion, cellular fibroadenoma versus phyllodes tumor    Imaging  07/22/2019 left breast ultrasound was a BI-RADS three secondary to an increasing mass at 1200 hours axis measuring 1 8 cm  01/23/2020 left breast ultrasound a BI-RADS four for continued increase of the mass in the left breast 1200 hours axis now measuring 2 cm    I reviewed the above laboratory and imaging data  Discussion/Summary:  63-year-old female here today secondary to a left breast tumor  This has been increasing in size  She did recently have a biopsy  This does reveal a cellular fibroepithelial lesion  The differential is a cellular fibroadenoma versus a phyllodes tumor  I discussed these diagnoses with her  She understands that if it were a phyllodes, it is likely to be benign  She also understands that the phyllodes tumors can grow tolarge sizes and can be recurrent in nature  I am recommending surgical excision  She has prominent nodularity today but no discrete masses  This could be secondary to the ecchymosis from the recent biopsy  I will therefore use ultrasound at the time of surgery to localize the area  All of her questions were answered  Consent was signed today in the office

## 2020-02-18 NOTE — PROGRESS NOTES
Surgical Oncology Follow Up       [address] [facility] SURGICAL ONCOLOGY [place]  [place]  [facility] [address]    Emil Quintanilla  1980  [id number]  [address] [facility] SURGICAL ONCOLOGY [place]  [place] [address]    Chief Complaint   Patient presents with    Follow-up     Pt is here to discuss surgery        Assessment/Plan   Diagnoses and all orders for this visit:    Benign tumor of breast, left    Other orders  -     ceFAZolin (ANCEF) IVPB (premix) 1,000 mg        Advance Care Planning/Advance Directives:  Did not discuss  with the patient  Oncology History:     No history exists  History of Present Illness: Follow-up visit secondary to a left breast tumor, reported no changes that she noted  -Interval History:  Recent biopsy    Review of Systems:  Review of Systems   Constitutional: Negative  Negative for appetite change and fever  Eyes: Negative  Respiratory: Negative for shortness of breath  Cardiovascular: Negative  Gastrointestinal: Negative  Endocrine: Negative  Genitourinary: Negative  Musculoskeletal: Negative  Negative for arthralgias and myalgias  Skin: Negative  Allergic/Immunologic: Negative  Neurological: Negative  Hematological: Negative  Negative for adenopathy  Does not bruise/bleed easily  Psychiatric/Behavioral: Negative          Patient Active Problem List   Diagnosis    Abnormal finding on breast imaging    Breast mass    Benign tumor of breast, left     Past Medical History:   Diagnosis Date    Breast mass     left beast    Headache      Past Surgical History:   Procedure Laterality Date    APPENDECTOMY      BREAST BIOPSY Left 12 years ago    benign    KNEE ARTHROSCOPY W/ ACL RECONSTRUCTION      TONSILLECTOMY      US GUIDED BREAST BIOPSY LEFT COMPLETE Left 2/4/2020     Family History   Problem Relation Age of Onset    Prostate cancer Maternal Grandfather 79    Lung cancer Maternal Grandfather 79    No Known Problems Mother     No Known Problems Father     No Known Problems Daughter     No Known Problems Maternal Grandmother     No Known Problems Paternal Grandmother     No Known Problems Paternal Grandfather     No Known Problems Brother     No Known Problems Daughter     No Known Problems Son     No Known Problems Son     No Known Problems Paternal Aunt     Breast cancer Paternal Aunt         63's    No Known Problems Paternal Aunt      Social History     Socioeconomic History    Marital status: /Civil Union     Spouse name: Not on file    Number of children: Not on file    Years of education: Not on file    Highest education level: Not on file   Occupational History    Not on file   Social Needs    Financial resource strain: Not on file    Food insecurity:     Worry: Not on file     Inability: Not on file    Transportation needs:     Medical: Not on file     Non-medical: Not on file   Tobacco Use    Smoking status: Never Smoker    Smokeless tobacco: Never Used   Substance and Sexual Activity    Alcohol use: Yes     Frequency: 2-3 times a week     Comment: soically    Drug use: No    Sexual activity: Not on file   Lifestyle    Physical activity:     Days per week: Not on file     Minutes per session: Not on file    Stress: Not on file   Relationships    Social connections:     Talks on phone: Not on file     Gets together: Not on file     Attends Confucianism service: Not on file     Active member of club or organization: Not on file     Attends meetings of clubs or organizations: Not on file     Relationship status: Not on file    Intimate partner violence:     Fear of current or ex partner: Not on file     Emotionally abused: Not on file     Physically abused: Not on file     Forced sexual activity: Not on file   Other Topics Concern    Not on file   Social History Narrative    Not on file       Current Outpatient Medications:     Calcium Carb-Cholecalciferol (CALCIUM 500+D PO), Take by mouth, Disp: , Rfl:     Multiple Vitamin (MULTIVITAMIN) tablet, Take 1 tablet by mouth daily, Disp: , Rfl:     butalbital-acetaminophen-caffeine (FIORICET,ESGIC) -40 mg per tablet, Take 1 tablet by mouth every 4 (four) hours as needed for headaches, Disp: , Rfl:     cyanocobalamin (VITAMIN B-12) 1,000 mcg tablet, Take by mouth daily, Disp: , Rfl:     fluticasone (FLONASE) 50 mcg/act nasal spray, 2 sprays into each nostril daily (Patient not taking: Reported on 2/26/2019), Disp: 16 g, Rfl: 1    predniSONE 10 mg tablet, Take 60 mg po day#1, 50 mg po day#2, 40 mg po day#3, 30 mg po day#4, 20 mg po day#5m and 10 mg po day#6 (Patient not taking: Reported on 2/26/2019), Disp: 21 tablet, Rfl: 0  No Known Allergies    The following portions of the patient's history were reviewed and updated as appropriate: allergies, current medications, past family history, past medical history, past social history, past surgical history and problem list         Vitals:    02/18/20 1031   BP: 120/60   Pulse: (!) 53   Resp: 16   Temp: (!) 96 6 °F (35 9 °C)       Physical Exam   Constitutional: She is oriented to person, place, and time  She appears well-developed and well-nourished  HENT:   Head: Normocephalic and atraumatic  Cardiovascular: Normal heart sounds  Pulmonary/Chest: Breath sounds normal  Right breast exhibits no inverted nipple, no mass, no nipple discharge, no skin change and no tenderness  Left breast exhibits mass (Prominent nodularity left 12-1 o'clock axis, 5 cm from the nipple) and skin change (Well healing biopsy site with resolving ecchymosis upper left breast)  Left breast exhibits no inverted nipple, no nipple discharge and no tenderness  Abdominal: Soft  Lymphadenopathy:        Right axillary: No pectoral and no lateral adenopathy present  Left axillary: No pectoral and no lateral adenopathy present         Right: No supraclavicular adenopathy present  Left: No supraclavicular adenopathy present  Neurological: She is alert and oriented to person, place, and time  Psychiatric: She has a normal mood and affect  Results:  Labs:  02/04/2020 core biopsy left breast 1200 hours reveals a cellular fibroepithelial lesion, cellular fibroadenoma versus phyllodes tumor    Imaging  07/22/2019 left breast ultrasound was a BI-RADS three secondary to an increasing mass at 1200 hours axis measuring 1 8 cm  01/23/2020 left breast ultrasound a BI-RADS four for continued increase of the mass in the left breast 1200 hours axis now measuring 2 cm    I reviewed the above laboratory and imaging data  Discussion/Summary:  78-year-old female here today secondary to a left breast tumor  This has been increasing in size  She did recently have a biopsy  This does reveal a cellular fibroepithelial lesion  The differential is a cellular fibroadenoma versus a phyllodes tumor  I discussed these diagnoses with her  She understands that if it were a phyllodes, it is likely to be benign  She also understands that the phyllodes tumors can grow tolarge sizes and can be recurrent in nature  I am recommending surgical excision  She has prominent nodularity today but no discrete masses  This could be secondary to the ecchymosis from the recent biopsy  I will therefore use ultrasound at the time of surgery to localize the area  All of her questions were answered  Consent was signed today in the office

## 2020-03-09 ENCOUNTER — ANESTHESIA EVENT (OUTPATIENT)
Dept: PERIOP | Facility: HOSPITAL | Age: 40
End: 2020-03-09
Payer: COMMERCIAL

## 2020-03-09 ENCOUNTER — APPOINTMENT (OUTPATIENT)
Dept: LAB | Facility: HOSPITAL | Age: 40
End: 2020-03-09
Attending: SURGERY
Payer: COMMERCIAL

## 2020-03-09 ENCOUNTER — APPOINTMENT (OUTPATIENT)
Dept: PREADMISSION TESTING | Facility: HOSPITAL | Age: 40
End: 2020-03-09
Payer: COMMERCIAL

## 2020-03-09 DIAGNOSIS — D24.2 BENIGN TUMOR OF BREAST, LEFT: ICD-10-CM

## 2020-03-09 LAB
ALBUMIN SERPL BCP-MCNC: 4.3 G/DL (ref 3.5–5)
ALP SERPL-CCNC: 59 U/L (ref 46–116)
ALT SERPL W P-5'-P-CCNC: 24 U/L (ref 12–78)
ANION GAP SERPL CALCULATED.3IONS-SCNC: 10 MMOL/L (ref 4–13)
APTT PPP: 27 SECONDS (ref 23–37)
AST SERPL W P-5'-P-CCNC: 20 U/L (ref 5–45)
BASOPHILS # BLD AUTO: 0.03 THOUSANDS/ΜL (ref 0–0.1)
BASOPHILS NFR BLD AUTO: 1 % (ref 0–1)
BILIRUB SERPL-MCNC: 0.61 MG/DL (ref 0.2–1)
BUN SERPL-MCNC: 14 MG/DL (ref 5–25)
CALCIUM SERPL-MCNC: 9.3 MG/DL (ref 8.3–10.1)
CHLORIDE SERPL-SCNC: 101 MMOL/L (ref 100–108)
CO2 SERPL-SCNC: 27 MMOL/L (ref 21–32)
CREAT SERPL-MCNC: 1 MG/DL (ref 0.6–1.3)
EOSINOPHIL # BLD AUTO: 0.06 THOUSAND/ΜL (ref 0–0.61)
EOSINOPHIL NFR BLD AUTO: 1 % (ref 0–6)
ERYTHROCYTE [DISTWIDTH] IN BLOOD BY AUTOMATED COUNT: 12.9 % (ref 11.6–15.1)
GFR SERPL CREATININE-BSD FRML MDRD: 71 ML/MIN/1.73SQ M
GLUCOSE SERPL-MCNC: 90 MG/DL (ref 65–140)
HCT VFR BLD AUTO: 43.3 % (ref 34.8–46.1)
HGB BLD-MCNC: 13.7 G/DL (ref 11.5–15.4)
IMM GRANULOCYTES # BLD AUTO: 0.01 THOUSAND/UL (ref 0–0.2)
IMM GRANULOCYTES NFR BLD AUTO: 0 % (ref 0–2)
INR PPP: 1.04 (ref 0.84–1.19)
LYMPHOCYTES # BLD AUTO: 1.88 THOUSANDS/ΜL (ref 0.6–4.47)
LYMPHOCYTES NFR BLD AUTO: 29 % (ref 14–44)
MCH RBC QN AUTO: 30.9 PG (ref 26.8–34.3)
MCHC RBC AUTO-ENTMCNC: 31.6 G/DL (ref 31.4–37.4)
MCV RBC AUTO: 98 FL (ref 82–98)
MONOCYTES # BLD AUTO: 0.62 THOUSAND/ΜL (ref 0.17–1.22)
MONOCYTES NFR BLD AUTO: 10 % (ref 4–12)
NEUTROPHILS # BLD AUTO: 3.91 THOUSANDS/ΜL (ref 1.85–7.62)
NEUTS SEG NFR BLD AUTO: 59 % (ref 43–75)
NRBC BLD AUTO-RTO: 0 /100 WBCS
PLATELET # BLD AUTO: 235 THOUSANDS/UL (ref 149–390)
PMV BLD AUTO: 10.4 FL (ref 8.9–12.7)
POTASSIUM SERPL-SCNC: 4.7 MMOL/L (ref 3.5–5.3)
PROT SERPL-MCNC: 7.5 G/DL (ref 6.4–8.2)
PROTHROMBIN TIME: 13.7 SECONDS (ref 11.6–14.5)
RBC # BLD AUTO: 4.43 MILLION/UL (ref 3.81–5.12)
SODIUM SERPL-SCNC: 138 MMOL/L (ref 136–145)
WBC # BLD AUTO: 6.51 THOUSAND/UL (ref 4.31–10.16)

## 2020-03-09 PROCEDURE — 85730 THROMBOPLASTIN TIME PARTIAL: CPT

## 2020-03-09 PROCEDURE — 36415 COLL VENOUS BLD VENIPUNCTURE: CPT

## 2020-03-09 PROCEDURE — 85025 COMPLETE CBC W/AUTO DIFF WBC: CPT

## 2020-03-09 PROCEDURE — 80053 COMPREHEN METABOLIC PANEL: CPT

## 2020-03-09 PROCEDURE — 85610 PROTHROMBIN TIME: CPT

## 2020-03-09 RX ORDER — ACETAMINOPHEN 325 MG/1
650 TABLET ORAL EVERY 6 HOURS PRN
COMMUNITY

## 2020-03-09 RX ORDER — PSEUDOEPHEDRINE HYDROCHLORIDE 30 MG/1
60 TABLET ORAL EVERY 4 HOURS PRN
COMMUNITY

## 2020-03-09 RX ORDER — SODIUM CHLORIDE 9 MG/ML
125 INJECTION, SOLUTION INTRAVENOUS CONTINUOUS
Status: CANCELLED | OUTPATIENT
Start: 2020-03-13

## 2020-03-09 NOTE — ANESTHESIA PREPROCEDURE EVALUATION
Review of Systems/Medical History  Patient summary reviewed  Chart reviewed  No history of anesthetic complications     Cardiovascular  Negative cardio ROS    Pulmonary  Negative pulmonary ROS        GI/Hepatic    GERD well controlled,        Negative  ROS        Endo/Other  Negative endo/other ROS      GYN  Negative gynecology ROS          Hematology  Negative hematology ROS      Musculoskeletal  Negative musculoskeletal ROS        Neurology    Headaches,    Psychology   Negative psychology ROS              Physical Exam    Airway    Mallampati score: II  TM Distance: >3 FB  Neck ROM: full     Dental       Cardiovascular  Comment: Negative ROS, Rhythm: regular, Rate: normal, Cardiovascular exam normal    Pulmonary  Pulmonary exam normal     Other Findings        Anesthesia Plan  ASA Score- 2     Anesthesia Type- IV sedation with anesthesia with ASA Monitors  Additional Monitors:   Airway Plan:     Comment: Local / TIVA  preferred to GA/LMA  Plan Factors-Patient not instructed to abstain from smoking on day of procedure  Patient did not smoke on day of surgery  Induction- intravenous  Postoperative Plan-     Informed Consent- Anesthetic plan and risks discussed with patient

## 2020-03-09 NOTE — PRE-PROCEDURE INSTRUCTIONS
Pre-Surgery Instructions:   Medication Instructions    acetaminophen (TYLENOL) 325 mg tablet Patient was instructed by Physician and understands   Calcium Carb-Cholecalciferol (CALCIUM 500+D PO) Patient was instructed by Physician and understands   Multiple Vitamin (MULTIVITAMIN) tablet Patient was instructed by Physician and understands   pseudoephedrine (SUDAFED) 30 mg tablet Patient was instructed by Physician and understands  [facility] preop instructions given to pt and reviewed  Pt given Chlorhexidine

## 2020-03-12 RX ORDER — SODIUM CHLORIDE 9 MG/ML
125 INJECTION, SOLUTION INTRAVENOUS CONTINUOUS
Status: CANCELLED | OUTPATIENT
Start: 2020-03-13

## 2020-03-13 ENCOUNTER — HOSPITAL ENCOUNTER (OUTPATIENT)
Facility: HOSPITAL | Age: 40
Setting detail: OUTPATIENT SURGERY
Discharge: HOME | End: 2020-03-13
Attending: SURGERY | Admitting: SURGERY
Payer: COMMERCIAL

## 2020-03-13 ENCOUNTER — ANESTHESIA (OUTPATIENT)
Dept: PERIOP | Facility: HOSPITAL | Age: 40
End: 2020-03-13
Payer: COMMERCIAL

## 2020-03-13 VITALS
RESPIRATION RATE: 16 BRPM | SYSTOLIC BLOOD PRESSURE: 124 MMHG | BODY MASS INDEX: 25.76 KG/M2 | OXYGEN SATURATION: 100 % | WEIGHT: 170 LBS | TEMPERATURE: 97.9 F | HEIGHT: 68 IN | DIASTOLIC BLOOD PRESSURE: 59 MMHG | HEART RATE: 65 BPM

## 2020-03-13 DIAGNOSIS — D24.2 BENIGN TUMOR OF BREAST, LEFT: ICD-10-CM

## 2020-03-13 LAB
EXT PREGNANCY TEST URINE: NEGATIVE
EXT. CONTROL: NORMAL

## 2020-03-13 PROCEDURE — 76998 US GUIDE INTRAOP: CPT | Performed by: SURGERY

## 2020-03-13 PROCEDURE — 88307 TISSUE EXAM BY PATHOLOGIST: CPT | Performed by: PATHOLOGY

## 2020-03-13 PROCEDURE — 19120 REMOVAL OF BREAST LESION: CPT | Performed by: SURGERY

## 2020-03-13 PROCEDURE — 81025 URINE PREGNANCY TEST: CPT | Performed by: ANESTHESIOLOGY

## 2020-03-13 RX ORDER — PROPOFOL 10 MG/ML
INJECTION, EMULSION INTRAVENOUS CONTINUOUS PRN
Status: DISCONTINUED | OUTPATIENT
Start: 2020-03-13 | End: 2020-03-13 | Stop reason: SURG

## 2020-03-13 RX ORDER — ACETAMINOPHEN 325 MG/1
650 TABLET ORAL EVERY 6 HOURS PRN
Status: DISCONTINUED | OUTPATIENT
Start: 2020-03-13 | End: 2020-03-13 | Stop reason: HOSPADM

## 2020-03-13 RX ORDER — ONDANSETRON 2 MG/ML
4 INJECTION INTRAMUSCULAR; INTRAVENOUS ONCE AS NEEDED
Status: DISCONTINUED | OUTPATIENT
Start: 2020-03-13 | End: 2020-03-13 | Stop reason: HOSPADM

## 2020-03-13 RX ORDER — KETOROLAC TROMETHAMINE 30 MG/ML
INJECTION, SOLUTION INTRAMUSCULAR; INTRAVENOUS AS NEEDED
Status: DISCONTINUED | OUTPATIENT
Start: 2020-03-13 | End: 2020-03-13 | Stop reason: SURG

## 2020-03-13 RX ORDER — ONDANSETRON 2 MG/ML
INJECTION INTRAMUSCULAR; INTRAVENOUS AS NEEDED
Status: DISCONTINUED | OUTPATIENT
Start: 2020-03-13 | End: 2020-03-13 | Stop reason: SURG

## 2020-03-13 RX ORDER — FENTANYL CITRATE 50 UG/ML
INJECTION, SOLUTION INTRAMUSCULAR; INTRAVENOUS AS NEEDED
Status: DISCONTINUED | OUTPATIENT
Start: 2020-03-13 | End: 2020-03-13 | Stop reason: SURG

## 2020-03-13 RX ORDER — MIDAZOLAM HYDROCHLORIDE 2 MG/2ML
INJECTION, SOLUTION INTRAMUSCULAR; INTRAVENOUS AS NEEDED
Status: DISCONTINUED | OUTPATIENT
Start: 2020-03-13 | End: 2020-03-13 | Stop reason: SURG

## 2020-03-13 RX ORDER — HYDROMORPHONE HCL/PF 1 MG/ML
0.5 SYRINGE (ML) INJECTION
Status: DISCONTINUED | OUTPATIENT
Start: 2020-03-13 | End: 2020-03-13 | Stop reason: HOSPADM

## 2020-03-13 RX ORDER — SODIUM CHLORIDE 9 MG/ML
125 INJECTION, SOLUTION INTRAVENOUS CONTINUOUS
Status: DISCONTINUED | OUTPATIENT
Start: 2020-03-13 | End: 2020-03-13 | Stop reason: HOSPADM

## 2020-03-13 RX ORDER — IBUPROFEN 600 MG/1
600 TABLET ORAL EVERY 8 HOURS PRN
Status: DISCONTINUED | OUTPATIENT
Start: 2020-03-13 | End: 2020-03-13 | Stop reason: HOSPADM

## 2020-03-13 RX ORDER — DEXAMETHASONE SODIUM PHOSPHATE 4 MG/ML
INJECTION, SOLUTION INTRA-ARTICULAR; INTRALESIONAL; INTRAMUSCULAR; INTRAVENOUS; SOFT TISSUE AS NEEDED
Status: DISCONTINUED | OUTPATIENT
Start: 2020-03-13 | End: 2020-03-13 | Stop reason: SURG

## 2020-03-13 RX ORDER — BUPIVACAINE HYDROCHLORIDE 5 MG/ML
INJECTION, SOLUTION PERINEURAL AS NEEDED
Status: DISCONTINUED | OUTPATIENT
Start: 2020-03-13 | End: 2020-03-13 | Stop reason: HOSPADM

## 2020-03-13 RX ORDER — CEFAZOLIN SODIUM 1 G/50ML
1000 SOLUTION INTRAVENOUS ONCE
Status: COMPLETED | OUTPATIENT
Start: 2020-03-13 | End: 2020-03-13

## 2020-03-13 RX ORDER — MAGNESIUM HYDROXIDE 1200 MG/15ML
LIQUID ORAL AS NEEDED
Status: DISCONTINUED | OUTPATIENT
Start: 2020-03-13 | End: 2020-03-13 | Stop reason: HOSPADM

## 2020-03-13 RX ADMIN — ONDANSETRON 4 MG: 2 INJECTION INTRAMUSCULAR; INTRAVENOUS at 13:51

## 2020-03-13 RX ADMIN — DEXAMETHASONE SODIUM PHOSPHATE 4 MG: 4 INJECTION, SOLUTION INTRAMUSCULAR; INTRAVENOUS at 13:51

## 2020-03-13 RX ADMIN — FENTANYL CITRATE 50 MCG: 50 INJECTION, SOLUTION INTRAMUSCULAR; INTRAVENOUS at 13:32

## 2020-03-13 RX ADMIN — FENTANYL CITRATE 50 MCG: 50 INJECTION, SOLUTION INTRAMUSCULAR; INTRAVENOUS at 14:00

## 2020-03-13 RX ADMIN — FENTANYL CITRATE 50 MCG: 50 INJECTION, SOLUTION INTRAMUSCULAR; INTRAVENOUS at 13:51

## 2020-03-13 RX ADMIN — PROPOFOL 120 MCG/KG/MIN: 10 INJECTION, EMULSION INTRAVENOUS at 13:33

## 2020-03-13 RX ADMIN — KETOROLAC TROMETHAMINE 30 MG: 30 INJECTION, SOLUTION INTRAMUSCULAR at 14:20

## 2020-03-13 RX ADMIN — SODIUM CHLORIDE: 0.9 INJECTION, SOLUTION INTRAVENOUS at 14:19

## 2020-03-13 RX ADMIN — SODIUM CHLORIDE 125 ML/HR: 0.9 INJECTION, SOLUTION INTRAVENOUS at 12:50

## 2020-03-13 RX ADMIN — CEFAZOLIN SODIUM 1000 MG: 1 SOLUTION INTRAVENOUS at 13:09

## 2020-03-13 RX ADMIN — FENTANYL CITRATE 50 MCG: 50 INJECTION, SOLUTION INTRAMUSCULAR; INTRAVENOUS at 14:19

## 2020-03-13 RX ADMIN — MIDAZOLAM 2 MG: 1 INJECTION INTRAMUSCULAR; INTRAVENOUS at 13:28

## 2020-03-13 NOTE — DISCHARGE INSTRUCTIONS
POST-OPERATIVE CARE INSTRUCTIONS       Care after your procedure:   General  · Rest and relax for 24 hours, then gradually return to normal activities  · Do not preform any heavy lifting or strenuous physical activities for 14 days  · Your activity restrictions will be re-evaluated at your post op visit  · Drink clear liquids until you are certain there is no nausea, then resume a normal diet  · Do not drink alcohol, drive any vehicle, operate mechanical equipment or make critical decisions for at least 24 hours and until you are off any narcotic pain medications  The Incision  · Your incision is closed with:   dissolvable stiches just underneath the skin  · The incision is also covered with:                          clear waterproof glue  · A gauze-pad is covering the wound  Wound care  · Remove your gauze-pad after 24 hours  · You may then shower using soap and water to clean your incision  Gently dry the wound  · You may redress your wound with additional gauze and tape if you choose  · A little bruising at the wound site is normal     Medication  · Resume all previous medications  · Take either Naproxen (Aleve) one tablet every 8 hours or Ibuprofen(Advil/Motrin) one(1) to two(2) tablets every 6 hours around the clock for the first 2-3 days  Take this even if you don't think you need it for at least the first 24 hours  · Pain Medication Instructions: over the counter tylenol          Other (If applicable)  · Wear a post-surgical bra around the clock  · May use ice to the incision site(s) for the next 24-48 hours, twice daily     Call your  doctor if you have any of the following:  · Redness, swelling, heat, drainage, and/or bleeding from your wound  · Chills or fever ( above 101' F )  · Pain, not relieved with the above medications  · If you have any questions or problems call our office [phone]    Follow-up appointment:  · As scheduled

## 2020-03-13 NOTE — ANESTHESIA POSTPROCEDURE EVALUATION
Post-Op Assessment Note    CV Status:  Stable  Pain Score: 2    Pain management: adequate     Mental Status:  Alert and awake   Hydration Status:  Euvolemic   PONV Controlled:  Controlled   Airway Patency:  Patent   Post Op Vitals Reviewed: Yes      Staff: Anesthesiologist           /52 (03/13/20 1504)    Temp 97 9 °F (36 6 °C) (03/13/20 1504)    Pulse 56 (03/13/20 1504)   Resp 15 (03/13/20 1504)    SpO2 99 % (03/13/20 1504)

## 2020-03-13 NOTE — OP NOTE
OPERATIVE REPORT  PATIENT NAME: Sara Webster    :  1980  MRN: [id number]  Pt Location: [facility] OR ROOM 05    SURGERY DATE: 3/13/2020    Surgeon(s) and Role:     * Angeles Rivera MD - Primary    Preop Diagnosis:  Benign tumor of breast, left [D24 2]    Post-Op Diagnosis Codes: * Benign tumor of breast, left [D24 2]    Procedure(s) (LRB):  EXC  Bx of left breast with intra op U/S (Left)   Use of intraoperative ultrasound    Specimen(s):  ID Type Source Tests Collected by Time Destination   1 : Left Breast Excision, short stitch = superior, long stitch= lateral  Tissue Breast, Left TISSUE EXAM Angeles Rivera MD 3/13/2020 1402    2 : New anterior/medial margin, suture marks true margin  Tissue Breast, Left TISSUE EXAM Angeles Rivera MD 3/13/2020 1408    3 : New posterior margin, suture marks true margin Tissue Breast, Left TISSUE EXAM Angeles Rivera MD 3/13/2020 1408        Estimated Blood Loss:   10 mL    Drains:  * No LDAs found *    Anesthesia Type:   General    Operative Indications:  Benign tumor of breast, left [D24 2]      Operative Findings:  n/a    Complications:   None    Procedure and Technique:  Rosana Whitman is a 77-year-old female who presented to my office with a mass in the left breast   This was either a fibroadenoma versus phyllodes tumor  She was counseled on surgical excision  She presented the day of surgery to the operating room  She was given IV conscious sedation  She had preoperative antibiotics  She was prepped and draped in the usual standard fashion  Time-out was performed  Attention was turned to the upper left breast   Intraoperative ultrasound was used to identify the lesion of concern in the 12-1 o'clock axis of the left breast   The skin was marked appropriately based on the lesion identified on ultrasound  0 5% Marcaine plain was used for local anesthesia  An incision was made in the circumareolar axis of the left breast superior    Electrocautery was used to dissect superior and slightly lateral to the level of the palpable lesion  This was excised completely with a margin of surrounding benign appearing breast tissue  The tissue specimen was oriented with a short stitch superior and a long stitch lateral   The closest area was medial/anterior and posterior  Therefore new margins were excised in these locations as well  Sutures were placed on the true margins  All breast specimens were sent to pathology in formalin  Her wound was irrigated and hemostasis was achieved  The wound was then closed in a layered fashion using interrupted 3-0 Monocryl suture and a running 4-0 Monocryl subcuticular stitch  All counts were correct  The skin was cleaned and dried  Surgical glue, fluffs and a bra were applied  The patient was then taken recovery in stable condition        Patient Disposition:  extubated and stable    SIGNATURE: Silvano Carl MD  DATE: March 13, 2020  TIME: 2:36 PM

## 2020-03-13 NOTE — INTERVAL H&P NOTE
H&P reviewed  After examining the patient I find no changes in the patients condition since the H&P had been written      Vitals:    03/13/20 1237   BP: 125/64   Pulse: 55   Resp: 16   Temp: 98 3 °F (36 8 °C)   SpO2: 100%

## 2020-04-01 ENCOUNTER — OFFICE VISIT (OUTPATIENT)
Dept: SURGICAL ONCOLOGY | Facility: CLINIC | Age: 40
End: 2020-04-01

## 2020-04-01 VITALS
RESPIRATION RATE: 18 BRPM | HEIGHT: 68 IN | TEMPERATURE: 98.3 F | SYSTOLIC BLOOD PRESSURE: 110 MMHG | DIASTOLIC BLOOD PRESSURE: 68 MMHG | BODY MASS INDEX: 26.31 KG/M2 | HEART RATE: 85 BPM | WEIGHT: 173.6 LBS

## 2020-04-01 DIAGNOSIS — N60.92 ATYPICAL LOBULAR HYPERPLASIA OF LEFT BREAST: ICD-10-CM

## 2020-04-01 DIAGNOSIS — D24.2 BENIGN TUMOR OF BREAST, LEFT: Primary | ICD-10-CM

## 2020-04-01 PROBLEM — N63.0 BREAST MASS: Status: RESOLVED | Noted: 2019-02-26 | Resolved: 2020-04-01

## 2020-04-01 PROCEDURE — 99024 POSTOP FOLLOW-UP VISIT: CPT | Performed by: SURGERY

## 2020-04-01 PROCEDURE — 3008F BODY MASS INDEX DOCD: CPT | Performed by: SURGERY

## 2020-10-01 ENCOUNTER — HOSPITAL ENCOUNTER (OUTPATIENT)
Dept: RADIOLOGY | Facility: HOSPITAL | Age: 40
Discharge: HOME | End: 2020-10-01
Attending: SURGERY
Payer: COMMERCIAL

## 2020-10-01 VITALS — BODY MASS INDEX: 26.68 KG/M2 | HEIGHT: 67 IN | WEIGHT: 170 LBS

## 2020-10-01 DIAGNOSIS — N60.92 ATYPICAL LOBULAR HYPERPLASIA OF LEFT BREAST: ICD-10-CM

## 2020-10-01 PROCEDURE — 77049 MRI BREAST C-+ W/CAD BI: CPT

## 2020-10-01 PROCEDURE — C8908 MRI W/O FOL W/CONT, BREAST,: HCPCS

## 2020-10-01 PROCEDURE — A9585 GADOBUTROL INJECTION: HCPCS | Performed by: SURGERY

## 2020-10-01 RX ADMIN — GADOBUTROL 8 ML: 604.72 INJECTION INTRAVENOUS at 16:24

## 2020-10-26 ENCOUNTER — OFFICE VISIT (OUTPATIENT)
Dept: SURGICAL ONCOLOGY | Facility: CLINIC | Age: 40
End: 2020-10-26
Payer: COMMERCIAL

## 2020-10-26 VITALS
SYSTOLIC BLOOD PRESSURE: 110 MMHG | HEART RATE: 73 BPM | HEIGHT: 67 IN | BODY MASS INDEX: 27.31 KG/M2 | DIASTOLIC BLOOD PRESSURE: 80 MMHG | WEIGHT: 174 LBS | TEMPERATURE: 97.4 F

## 2020-10-26 DIAGNOSIS — R92.8 ABNORMAL FINDING ON BREAST IMAGING: Primary | ICD-10-CM

## 2020-10-26 DIAGNOSIS — Z12.31 SCREENING MAMMOGRAM FOR HIGH-RISK PATIENT: ICD-10-CM

## 2020-10-26 PROCEDURE — 99213 OFFICE O/P EST LOW 20 MIN: CPT | Performed by: SURGERY

## 2020-10-30 ENCOUNTER — HOSPITAL ENCOUNTER (OUTPATIENT)
Dept: ULTRASOUND IMAGING | Facility: CLINIC | Age: 40
Discharge: HOME | End: 2020-10-30
Admitting: RADIOLOGY
Payer: COMMERCIAL

## 2020-10-30 ENCOUNTER — HOSPITAL ENCOUNTER (OUTPATIENT)
Dept: ULTRASOUND IMAGING | Facility: CLINIC | Age: 40
Discharge: HOME | End: 2020-10-30
Payer: COMMERCIAL

## 2020-10-30 ENCOUNTER — HOSPITAL ENCOUNTER (OUTPATIENT)
Dept: MAMMOGRAPHY | Facility: CLINIC | Age: 40
Discharge: HOME | End: 2020-10-30
Payer: COMMERCIAL

## 2020-10-30 VITALS — TEMPERATURE: 96.6 F | BODY MASS INDEX: 27.31 KG/M2 | WEIGHT: 174 LBS | HEIGHT: 67 IN

## 2020-10-30 VITALS — TEMPERATURE: 96.6 F | DIASTOLIC BLOOD PRESSURE: 60 MMHG | HEART RATE: 60 BPM | SYSTOLIC BLOOD PRESSURE: 110 MMHG

## 2020-10-30 DIAGNOSIS — R92.8 ABNORMAL FINDING ON BREAST IMAGING: ICD-10-CM

## 2020-10-30 DIAGNOSIS — R92.8 ABNORMAL ULTRASOUND OF BREAST: ICD-10-CM

## 2020-10-30 PROCEDURE — 88305 TISSUE EXAM BY PATHOLOGIST: CPT | Performed by: PATHOLOGY

## 2020-10-30 PROCEDURE — 76642 ULTRASOUND BREAST LIMITED: CPT

## 2020-10-30 PROCEDURE — 19083 BX BREAST 1ST LESION US IMAG: CPT

## 2020-10-30 RX ORDER — LIDOCAINE HYDROCHLORIDE 10 MG/ML
5 INJECTION, SOLUTION EPIDURAL; INFILTRATION; INTRACAUDAL; PERINEURAL ONCE
Status: COMPLETED | OUTPATIENT
Start: 2020-10-30 | End: 2020-10-30

## 2020-10-30 RX ADMIN — LIDOCAINE HYDROCHLORIDE 5 ML: 10 INJECTION, SOLUTION EPIDURAL; INFILTRATION; INTRACAUDAL; PERINEURAL at 09:20

## 2020-12-21 ENCOUNTER — IMMUNIZATIONS (OUTPATIENT)
Dept: FAMILY MEDICINE CLINIC | Facility: HOSPITAL | Age: 40
End: 2020-12-21
Payer: COMMERCIAL

## 2020-12-21 DIAGNOSIS — Z23 ENCOUNTER FOR IMMUNIZATION: ICD-10-CM

## 2020-12-21 PROCEDURE — 0001A SARS-COV-2 / COVID-19 MRNA VACCINE (PFIZER-BIONTECH) 30 MCG: CPT

## 2020-12-21 PROCEDURE — 91300 SARS-COV-2 / COVID-19 MRNA VACCINE (PFIZER-BIONTECH) 30 MCG: CPT

## 2021-01-09 ENCOUNTER — IMMUNIZATIONS (OUTPATIENT)
Dept: FAMILY MEDICINE CLINIC | Facility: HOSPITAL | Age: 41
End: 2021-01-09

## 2021-01-09 DIAGNOSIS — Z23 ENCOUNTER FOR IMMUNIZATION: ICD-10-CM

## 2021-01-09 PROCEDURE — 0002A SARS-COV-2 / COVID-19 MRNA VACCINE (PFIZER-BIONTECH) 30 MCG: CPT

## 2021-01-09 PROCEDURE — 91300 SARS-COV-2 / COVID-19 MRNA VACCINE (PFIZER-BIONTECH) 30 MCG: CPT

## 2021-04-01 ENCOUNTER — HOSPITAL ENCOUNTER (OUTPATIENT)
Dept: MAMMOGRAPHY | Facility: MEDICAL CENTER | Age: 41
Discharge: HOME | End: 2021-04-01
Payer: COMMERCIAL

## 2021-04-01 VITALS — BODY MASS INDEX: 27.31 KG/M2 | HEIGHT: 67 IN | WEIGHT: 174 LBS

## 2021-04-01 DIAGNOSIS — R92.8 ABNORMAL FINDING ON BREAST IMAGING: Primary | ICD-10-CM

## 2021-04-01 DIAGNOSIS — Z12.31 SCREENING MAMMOGRAM FOR HIGH-RISK PATIENT: ICD-10-CM

## 2021-04-01 PROCEDURE — 77063 BREAST TOMOSYNTHESIS BI: CPT

## 2021-04-01 PROCEDURE — 77067 SCR MAMMO BI INCL CAD: CPT

## 2021-04-14 ENCOUNTER — HOSPITAL ENCOUNTER (OUTPATIENT)
Dept: MAMMOGRAPHY | Facility: CLINIC | Age: 41
Discharge: HOME | End: 2021-04-14
Payer: COMMERCIAL

## 2021-04-14 ENCOUNTER — HOSPITAL ENCOUNTER (OUTPATIENT)
Dept: ULTRASOUND IMAGING | Facility: CLINIC | Age: 41
Discharge: HOME | End: 2021-04-14
Payer: COMMERCIAL

## 2021-04-14 DIAGNOSIS — R92.8 ABNORMAL FINDING ON BREAST IMAGING: ICD-10-CM

## 2021-04-14 PROCEDURE — G0279 TOMOSYNTHESIS, MAMMO: HCPCS

## 2021-04-14 PROCEDURE — 76642 ULTRASOUND BREAST LIMITED: CPT

## 2021-04-14 PROCEDURE — 77065 DX MAMMO INCL CAD UNI: CPT

## 2021-04-26 ENCOUNTER — OFFICE VISIT (OUTPATIENT)
Dept: SURGICAL ONCOLOGY | Facility: CLINIC | Age: 41
End: 2021-04-26
Payer: COMMERCIAL

## 2021-04-26 VITALS
BODY MASS INDEX: 27.62 KG/M2 | HEIGHT: 67 IN | TEMPERATURE: 97.4 F | WEIGHT: 176 LBS | SYSTOLIC BLOOD PRESSURE: 120 MMHG | DIASTOLIC BLOOD PRESSURE: 82 MMHG | HEART RATE: 65 BPM

## 2021-04-26 DIAGNOSIS — R92.8 ABNORMAL FINDING ON BREAST IMAGING: ICD-10-CM

## 2021-04-26 DIAGNOSIS — R92.2 DENSE BREAST TISSUE: ICD-10-CM

## 2021-04-26 DIAGNOSIS — N60.92 ATYPICAL LOBULAR HYPERPLASIA OF LEFT BREAST: Primary | ICD-10-CM

## 2021-04-26 DIAGNOSIS — D24.1 FIBROADENOMA OF BOTH BREASTS: ICD-10-CM

## 2021-04-26 DIAGNOSIS — D24.2 FIBROADENOMA OF BOTH BREASTS: ICD-10-CM

## 2021-04-26 PROBLEM — R92.30 DENSE BREAST TISSUE: Status: ACTIVE | Noted: 2021-04-26

## 2021-04-26 PROCEDURE — 3008F BODY MASS INDEX DOCD: CPT | Performed by: SURGERY

## 2021-04-26 PROCEDURE — 1036F TOBACCO NON-USER: CPT | Performed by: SURGERY

## 2021-04-26 PROCEDURE — 99214 OFFICE O/P EST MOD 30 MIN: CPT | Performed by: SURGERY

## 2021-04-26 NOTE — PROGRESS NOTES
Surgical Oncology Follow Up       [address] [facility] SURGICAL ONCOLOGY [facility] [address]    [facility]  1980  [id number]  [facility] SURGICAL ONCOLOGY [place]  [place] [address]    Chief Complaint   Patient presents with    Follow-up       Assessment/Plan   Diagnoses and all orders for this visit:    Atypical lobular hyperplasia of left breast  -     MRI breast bilateral w and wo contrast w cad; Future    Fibroadenoma of both breasts    Abnormal finding on breast imaging    Dense breast tissue        Advance Care Planning/Advance Directives:  Did not discuss  with the patient  Oncology History:    Oncology History    No history exists  History of Present Illness: Follow-up visit secondary to atypical lobular hyperplasia, dense breast tissue and fibroadenomata, reports no concerns  -Interval History: recent mammogram and ultrasound    Review of Systems:  Review of Systems   Constitutional: Negative  Negative for appetite change and fever  Eyes: Negative  Respiratory: Negative for shortness of breath  Cardiovascular: Negative  Gastrointestinal: Negative  Endocrine: Negative  Genitourinary: Negative  Musculoskeletal: Negative  Negative for arthralgias and myalgias  Skin: Negative  Allergic/Immunologic: Negative  Neurological: Negative  Hematological: Negative  Negative for adenopathy  Does not bruise/bleed easily  Psychiatric/Behavioral: Negative          Patient Active Problem List   Diagnosis    Abnormal finding on breast imaging    Fibroadenoma of both breasts    Atypical lobular hyperplasia of left breast    Screening mammogram for high-risk patient    Dense breast tissue     Past Medical History:   Diagnosis Date    GERD (gastroesophageal reflux disease)     Headache     Hypotension     Irregular heart beat     pt states she can feel her heart skip a beat or pause sometimes    Migraines      Past Surgical History:   Procedure Laterality Date    APPENDECTOMY      BREAST BIOPSY Left 12 years ago    benign    BREAST CYST EXCISION Left     12:00 Benign Fibroadenoma    CERVICAL BIOPSY  W/ LOOP ELECTRODE EXCISION       SECTION      CHEST WALL BIOPSY Left 3/13/2020    Procedure: EXC  Bx of left breast with intra op U/S;  Surgeon: Serenity Joseph MD;  Location: [facility];  Service: Surgical Oncology    DORSAL COMPARTMENT RELEASE Bilateral     EGD      KNEE ARTHROSCOPY W/ ACL RECONSTRUCTION      TONSILLECTOMY      US GUIDED BREAST BIOPSY LEFT COMPLETE Left 2020    US GUIDED BREAST BIOPSY RIGHT COMPLETE Right 10/30/2020    WISDOM TOOTH EXTRACTION       Family History   Problem Relation Age of Onset    Prostate cancer Maternal Grandfather 79    Lung cancer Maternal Grandfather 79    No Known Problems Mother     No Known Problems Father     No Known Problems Daughter     No Known Problems Maternal Grandmother     No Known Problems Paternal Grandmother     No Known Problems Paternal Grandfather     No Known Problems Brother     No Known Problems Daughter     No Known Problems Son     No Known Problems Son     No Known Problems Paternal Aunt     Breast cancer Paternal Aunt         63's    No Known Problems Paternal Aunt      Social History     Socioeconomic History    Marital status: /Civil Union     Spouse name: Not on file    Number of children: Not on file    Years of education: Not on file    Highest education level: Not on file   Occupational History    Not on file   Social Needs    Financial resource strain: Not on file    Food insecurity     Worry: Not on file     Inability: Not on file    Transportation needs     Medical: Not on file     Non-medical: Not on file   Tobacco Use    Smoking status: Never Smoker    Smokeless tobacco: Never Used   Substance and Sexual Activity    Alcohol use:  Yes Frequency: 2-3 times a week     Drinks per session: 1 or 2     Comment: soically    Drug use: No    Sexual activity: Not on file   Lifestyle    Physical activity     Days per week: Not on file     Minutes per session: Not on file    Stress: Not on file   Relationships    Social connections     Talks on phone: Not on file     Gets together: Not on file     Attends Holiness service: Not on file     Active member of club or organization: Not on file     Attends meetings of clubs or organizations: Not on file     Relationship status: Not on file    Intimate partner violence     Fear of current or ex partner: Not on file     Emotionally abused: Not on file     Physically abused: Not on file     Forced sexual activity: Not on file   Other Topics Concern    Not on file   Social History Narrative    Not on file       Current Outpatient Medications:     Calcium Carb-Cholecalciferol (CALCIUM 500+D PO), Take 1 tablet by mouth daily , Disp: , Rfl:     Multiple Vitamin (MULTIVITAMIN) tablet, Take 1 tablet by mouth daily, Disp: , Rfl:     acetaminophen (TYLENOL) 325 mg tablet, Take 650 mg by mouth every 6 (six) hours as needed for mild pain, Disp: , Rfl:     pseudoephedrine (SUDAFED) 30 mg tablet, Take 60 mg by mouth every 4 (four) hours as needed for congestion, Disp: , Rfl:   No Known Allergies    The following portions of the patient's history were reviewed and updated as appropriate: allergies, current medications, past family history, past medical history, past social history, past surgical history and problem list         Vitals:    04/26/21 1117   BP: 120/82   Pulse: 65   Temp: (!) 97 4 °F (36 3 °C)       Physical Exam  Constitutional:       General: She is not in acute distress  Appearance: Normal appearance  She is well-developed  HENT:      Head: Normocephalic and atraumatic  Chest:      Breasts:         Right: No inverted nipple, mass, nipple discharge, skin change or tenderness           Left: Skin change (  Circumareolar scar) present  No inverted nipple, mass, nipple discharge or tenderness  Lymphadenopathy:      Upper Body:      Right upper body: No supraclavicular, axillary or pectoral adenopathy  Left upper body: No supraclavicular, axillary or pectoral adenopathy  Neurological:      Mental Status: She is alert and oriented to person, place, and time  Psychiatric:         Mood and Affect: Mood normal            Results:  Labs:   10/30/2020 core biopsy right breast 0900 hours reveals a benign cellular fibroadenoma    Imaging   10/30/2020 5 mm correlate to the MRI finding for which biopsy was recommended as noted above   04/01/2021 bilateral 3D screening mammogram was a BI-RADS 0 secondary to a 10 mm mass at the 0900 hours axis as well   as asymmetry, density is a three   04/14/2021 left 3D diagnostic mammogram and ultrasound shows a stable lesion at 0900 hours likely a benign fibroadenoma, the asymmetry with secondary to the scar tissue this is a BI-RADS two density of three    I reviewed the above laboratory and imaging data  Discussion/Summary: 12-year-old female who underwent left breast excision for a cellular fibroepithelial lesion  The final pathology revealed a cellular fibroadenoma  She did have incidental atypical lobular hyperplasia  She also has dense breast tissue  She is a high risk patient  She had an interval biopsy on the contralateral breast which was a benign fibroadenoma  Her most recent imaging was ultimately benign  There are no concerns on examination today  I will make arrangements for her MRI due in October  I will see her again in six months or sooner should the need arise

## 2021-10-15 ENCOUNTER — HOSPITAL ENCOUNTER (OUTPATIENT)
Dept: RADIOLOGY | Facility: HOSPITAL | Age: 41
Discharge: HOME | End: 2021-10-15
Attending: SURGERY
Payer: COMMERCIAL

## 2021-10-15 DIAGNOSIS — N60.92 ATYPICAL LOBULAR HYPERPLASIA OF LEFT BREAST: ICD-10-CM

## 2021-10-15 PROCEDURE — A9585 GADOBUTROL INJECTION: HCPCS | Performed by: SURGERY

## 2021-10-15 PROCEDURE — G1004 CDSM NDSC: HCPCS

## 2021-10-15 PROCEDURE — C8908 MRI W/O FOL W/CONT, BREAST,: HCPCS

## 2021-10-15 PROCEDURE — 77049 MRI BREAST C-+ W/CAD BI: CPT

## 2021-10-15 RX ADMIN — GADOBUTROL 9 ML: 604.72 INJECTION INTRAVENOUS at 15:03

## 2021-10-30 ENCOUNTER — IMMUNIZATIONS (OUTPATIENT)
Dept: FAMILY MEDICINE CLINIC | Facility: MEDICAL CENTER | Age: 41
End: 2021-10-30

## 2021-10-30 DIAGNOSIS — Z23 ENCOUNTER FOR IMMUNIZATION: Primary | ICD-10-CM

## 2021-10-30 PROCEDURE — 91300 SARSCOV2 VAC 30MCG/0.3ML IM: CPT

## 2021-11-08 ENCOUNTER — OFFICE VISIT (OUTPATIENT)
Dept: SURGICAL ONCOLOGY | Facility: CLINIC | Age: 41
End: 2021-11-08
Payer: COMMERCIAL

## 2021-11-08 VITALS
OXYGEN SATURATION: 98 % | BODY MASS INDEX: 27.88 KG/M2 | HEART RATE: 65 BPM | WEIGHT: 177.6 LBS | TEMPERATURE: 98.2 F | DIASTOLIC BLOOD PRESSURE: 80 MMHG | SYSTOLIC BLOOD PRESSURE: 114 MMHG | RESPIRATION RATE: 16 BRPM | HEIGHT: 67 IN

## 2021-11-08 DIAGNOSIS — Z12.31 SCREENING MAMMOGRAM FOR HIGH-RISK PATIENT: ICD-10-CM

## 2021-11-08 DIAGNOSIS — R92.2 DENSE BREAST TISSUE: ICD-10-CM

## 2021-11-08 DIAGNOSIS — N60.92 ATYPICAL LOBULAR HYPERPLASIA OF LEFT BREAST: Primary | ICD-10-CM

## 2021-11-08 PROBLEM — R92.8 ABNORMAL FINDING ON BREAST IMAGING: Status: RESOLVED | Noted: 2019-02-26 | Resolved: 2021-11-08

## 2021-11-08 PROBLEM — D24.2 FIBROADENOMA OF BOTH BREASTS: Status: RESOLVED | Noted: 2020-02-18 | Resolved: 2021-11-08

## 2021-11-08 PROBLEM — D24.1 FIBROADENOMA OF BOTH BREASTS: Status: RESOLVED | Noted: 2020-02-18 | Resolved: 2021-11-08

## 2021-11-08 PROCEDURE — 99213 OFFICE O/P EST LOW 20 MIN: CPT | Performed by: SURGERY

## 2022-02-24 ENCOUNTER — TELEPHONE (OUTPATIENT)
Dept: HEMATOLOGY ONCOLOGY | Facility: CLINIC | Age: 42
End: 2022-02-24

## 2022-02-24 NOTE — TELEPHONE ENCOUNTER
Appointment Cancellation Or Reschedule     Person calling in Patient    Provider Dr Clau Aparicio   Office Visit Date and Time 6/27/22 @ 8:15am   Office Visit Location [place]   Did patient want to reschedule their office appointment? If so, when was it scheduled to? Yes 10/12/22 @ 3:15pm   Is this patient calling to reschedule an infusion appointment? no   When is their next infusion appointment? na   Is this patient a Chemo patient? no   Reason for Cancellation or Reschedule Unable to keep appt     If the patient is a treatment patient, please route this to the office nurse  If the patient is not on treatment, please route to the office MA

## 2022-04-04 ENCOUNTER — HOSPITAL ENCOUNTER (OUTPATIENT)
Dept: MAMMOGRAPHY | Facility: MEDICAL CENTER | Age: 42
Discharge: HOME | End: 2022-04-04
Payer: COMMERCIAL

## 2022-04-04 VITALS — WEIGHT: 177.69 LBS | BODY MASS INDEX: 27.89 KG/M2 | HEIGHT: 67 IN

## 2022-04-04 DIAGNOSIS — Z12.31 SCREENING MAMMOGRAM FOR HIGH-RISK PATIENT: ICD-10-CM

## 2022-04-04 PROCEDURE — 77067 SCR MAMMO BI INCL CAD: CPT

## 2022-04-04 PROCEDURE — 77063 BREAST TOMOSYNTHESIS BI: CPT

## 2022-10-12 ENCOUNTER — OFFICE VISIT (OUTPATIENT)
Dept: SURGICAL ONCOLOGY | Facility: CLINIC | Age: 42
End: 2022-10-12
Payer: COMMERCIAL

## 2022-10-12 VITALS
TEMPERATURE: 98.9 F | RESPIRATION RATE: 16 BRPM | BODY MASS INDEX: 28.25 KG/M2 | SYSTOLIC BLOOD PRESSURE: 132 MMHG | HEART RATE: 79 BPM | DIASTOLIC BLOOD PRESSURE: 80 MMHG | WEIGHT: 180 LBS | OXYGEN SATURATION: 98 % | HEIGHT: 67 IN

## 2022-10-12 DIAGNOSIS — Z91.89 AT INCREASED RISK OF BREAST CANCER: ICD-10-CM

## 2022-10-12 DIAGNOSIS — Z12.31 SCREENING MAMMOGRAM FOR HIGH-RISK PATIENT: ICD-10-CM

## 2022-10-12 DIAGNOSIS — N60.92 ATYPICAL LOBULAR HYPERPLASIA OF LEFT BREAST: Primary | ICD-10-CM

## 2022-10-12 DIAGNOSIS — R92.2 DENSE BREAST TISSUE: ICD-10-CM

## 2022-10-12 PROCEDURE — 99213 OFFICE O/P EST LOW 20 MIN: CPT | Performed by: SURGERY

## 2022-10-12 NOTE — PROGRESS NOTES
Surgical Oncology Follow Up       [address] [facility] [place]  [facility] [id number]    Livier Salmeron  1980  [id number]  [address] [facility] [place]  [place] [id number]    Chief Complaint   Patient presents with   • Follow-up       Assessment/Plan   Diagnoses and all orders for this visit:    Atypical lobular hyperplasia of left breast  -     MRI breast bilateral w and wo contrast w cad; Future    Screening mammogram for high-risk patient  -     Mammo screening bilateral w 3d & cad; Future    Dense breast tissue  -     MRI breast bilateral w and wo contrast w cad; Future    At increased risk of breast cancer  -     MRI breast bilateral w and wo contrast w cad; Future        Advance Care Planning/Advance Directives:  Did not discuss  with the patient  Oncology History:    Oncology History    No history exists  History of Present Illness: Follow-up visit secondary to her history of atypia and dense breast tissue, no concerns  -Interval History:  Benign mammogram    Review of Systems:  Review of Systems   Constitutional: Negative  Negative for appetite change and fever  Eyes: Negative  Respiratory: Negative for shortness of breath  Cardiovascular: Negative  Gastrointestinal: Negative  Endocrine: Negative  Genitourinary: Negative  Musculoskeletal: Negative  Negative for arthralgias and myalgias  Skin: Negative  Allergic/Immunologic: Negative  Neurological: Negative  Hematological: Negative  Negative for adenopathy  Does not bruise/bleed easily  Psychiatric/Behavioral: Negative          Patient Active Problem List   Diagnosis   • Atypical lobular hyperplasia of left breast   • Screening mammogram for high-risk patient   • Dense breast tissue   • At increased risk of breast cancer     Past Medical History:   Diagnosis Date   • GERD (gastroesophageal reflux disease)    • Headache    • Hypotension    • Irregular heart beat     pt states she can feel her heart skip a beat or pause sometimes   • Migraines      Past Surgical History:   Procedure Laterality Date   • APPENDECTOMY     • BREAST BIOPSY Left 12 years ago    benign   • BREAST CYST EXCISION Left     12:00 Benign Fibroadenoma   • CERVICAL BIOPSY  W/ LOOP ELECTRODE EXCISION     •  SECTION     • CHEST WALL BIOPSY Left 3/13/2020    Procedure: EXC  Bx of left breast with intra op U/S;  Surgeon: Pari Bucio MD;  Location: [facility];  Service: Surgical Oncology   • DORSAL COMPARTMENT RELEASE Bilateral    • EGD     • KNEE ARTHROSCOPY W/ ACL RECONSTRUCTION     • TONSILLECTOMY     • US GUIDED BREAST BIOPSY LEFT COMPLETE Left 2020   • US GUIDED BREAST BIOPSY RIGHT COMPLETE Right 10/30/2020   • WISDOM TOOTH EXTRACTION       Family History   Problem Relation Age of Onset   • Prostate cancer Maternal Grandfather 79   • Lung cancer Maternal Grandfather 79   • No Known Problems Mother    • No Known Problems Father    • No Known Problems Daughter    • No Known Problems Maternal Grandmother    • No Known Problems Paternal Grandmother    • No Known Problems Paternal Grandfather    • No Known Problems Brother    • No Known Problems Daughter    • No Known Problems Son    • No Known Problems Son    • No Known Problems Paternal Aunt    • Breast cancer Paternal Aunt         62's   • No Known Problems Paternal Aunt      Social History     Socioeconomic History   • Marital status: /Civil Union     Spouse name: Not on file   • Number of children: Not on file   • Years of education: Not on file   • Highest education level: Not on file   Occupational History   • Not on file   Tobacco Use   • Smoking status: Never Smoker   • Smokeless tobacco: Never Used   Vaping Use   • Vaping Use: Never used   Substance and Sexual Activity   • Alcohol use: Yes     Comment: soically   • Drug use: No   • Sexual activity: Not on file   Other Topics Concern   • Not on file   Social History Narrative   • Not on file     Social Determinants of Health     Financial Resource Strain: Not on file   Food Insecurity: Not on file   Transportation Needs: Not on file   Physical Activity: Not on file   Stress: Not on file   Social Connections: Not on file   Intimate Partner Violence: Not on file   Housing Stability: Not on file       Current Outpatient Medications:   •  acetaminophen (TYLENOL) 325 mg tablet, Take 650 mg by mouth every 6 (six) hours as needed for mild pain, Disp: , Rfl:   •  Calcium Carb-Cholecalciferol (CALCIUM 500+D PO), Take 1 tablet by mouth daily , Disp: , Rfl:   •  Multiple Vitamin (MULTIVITAMIN) tablet, Take 1 tablet by mouth daily, Disp: , Rfl:   •  pseudoephedrine (SUDAFED) 30 mg tablet, Take 60 mg by mouth every 4 (four) hours as needed for congestion, Disp: , Rfl:   No Known Allergies    The following portions of the patient's history were reviewed and updated as appropriate: allergies, current medications, past family history, past medical history, past social history, past surgical history and problem list         Vitals:    10/12/22 1516   BP: 132/80   Pulse: 79   Resp: 16   Temp: 98 9 °F (37 2 °C)   SpO2: 98%       Physical Exam  Constitutional:       General: She is not in acute distress  Appearance: Normal appearance  She is well-developed  HENT:      Head: Normocephalic and atraumatic  Chest:   Breasts:      Right: No inverted nipple, mass, nipple discharge, skin change, tenderness, axillary adenopathy or supraclavicular adenopathy  Left: No inverted nipple, mass, nipple discharge, skin change, tenderness, axillary adenopathy or supraclavicular adenopathy  Lymphadenopathy:      Upper Body:      Right upper body: No supraclavicular, axillary or pectoral adenopathy  Left upper body: No supraclavicular, axillary or pectoral adenopathy     Neurological:      Mental Status: She is alert and oriented to person, place, and time  Psychiatric:         Mood and Affect: Mood normal            Results:  Labs:      Imaging  04/04/2022 bilateral 3D screening mammogram is benign BI-RADS two with a density of three    I reviewed the above imaging data  Discussion/Summary:  45-year-old female with a history of benign breast disease  She did have atypical lobular hyperplasia on excision in 2020  This deemed her high risk patient  She also has dense breast tissue  There are no concerns on exam today  Her recent mammogram was benign  She is due for an MRI now  I will make these arrangements for her  I will also set up her mammogram for next spring  I will see her again in six months or sooner should the need arise

## 2022-11-10 ENCOUNTER — HOSPITAL ENCOUNTER (OUTPATIENT)
Dept: MRI IMAGING | Facility: HOSPITAL | Age: 42
End: 2022-11-10
Attending: SURGERY

## 2022-11-10 DIAGNOSIS — Z91.89 AT INCREASED RISK OF BREAST CANCER: ICD-10-CM

## 2022-11-10 DIAGNOSIS — R92.2 DENSE BREAST TISSUE: ICD-10-CM

## 2022-11-10 DIAGNOSIS — N60.92 ATYPICAL LOBULAR HYPERPLASIA OF LEFT BREAST: ICD-10-CM

## 2022-11-10 RX ADMIN — GADOBUTROL 8 ML: 604.72 INJECTION INTRAVENOUS at 09:53

## 2022-12-14 ENCOUNTER — TELEPHONE (OUTPATIENT)
Dept: FAMILY MEDICINE CLINIC | Facility: CLINIC | Age: 42
End: 2022-12-14

## 2022-12-14 NOTE — TELEPHONE ENCOUNTER
I called and spoke with the Pt, pt has not been since since 2018  Does not follow with new PCP, pt is scheduled to come in 1/26

## 2023-02-15 ENCOUNTER — OFFICE VISIT (OUTPATIENT)
Dept: FAMILY MEDICINE CLINIC | Facility: CLINIC | Age: 43
End: 2023-02-15

## 2023-02-15 VITALS
DIASTOLIC BLOOD PRESSURE: 88 MMHG | TEMPERATURE: 97.2 F | BODY MASS INDEX: 28.56 KG/M2 | HEIGHT: 67 IN | OXYGEN SATURATION: 99 % | SYSTOLIC BLOOD PRESSURE: 124 MMHG | HEART RATE: 68 BPM | WEIGHT: 182 LBS

## 2023-02-15 DIAGNOSIS — H10.13 ALLERGIC CONJUNCTIVITIS OF BOTH EYES: Primary | ICD-10-CM

## 2023-02-15 RX ORDER — FLUTICASONE PROPIONATE 50 MCG
1 SPRAY, SUSPENSION (ML) NASAL DAILY
COMMUNITY

## 2023-02-15 RX ORDER — CETIRIZINE HYDROCHLORIDE 10 MG/1
10 TABLET ORAL DAILY
COMMUNITY

## 2023-02-15 RX ORDER — PREDNISONE 10 MG/1
TABLET ORAL
Qty: 21 TABLET | Refills: 0 | Status: SHIPPED | OUTPATIENT
Start: 2023-02-15

## 2023-02-15 RX ORDER — OLOPATADINE HYDROCHLORIDE 1 MG/ML
1 SOLUTION/ DROPS OPHTHALMIC 2 TIMES DAILY PRN
Qty: 5 ML | Refills: 0 | Status: SHIPPED | OUTPATIENT
Start: 2023-02-15

## 2023-02-15 NOTE — PROGRESS NOTES
Name: Suman Hercules      : 1980      MRN: [id number]  Encounter Provider: Koby Stephens DO  Encounter Date: 2/15/2023   Encounter department: [address] [place] [address]  Chief Complaint   Patient presents with   • Allergic Reaction     Pt states it has been almost 4 weeks states it is worse in the morning and gets better through out the day  Has been using zyrtec and flonase and no change, eyes burn no itching, no changes in personal items      Patient Instructions   Patient to start Patanol eye drops as directed and will start prednisone as directed for allergic conjunctivitis  Call if not better and will consider ophthalmology consult if not improved  Assessment & Plan     1  Allergic conjunctivitis of both eyes  -     olopatadine (PATANOL) 0 1 % ophthalmic solution; Administer 1 drop to both eyes 2 (two) times a day as needed for allergies  -     predniSONE 10 mg tablet; Take 60 mg po day#1, 50 mg po day#2, 40 mg po day#3, 30 mg po day#4, 20 mg po day#5m and 10 mg po day#6           Subjective      Allergic Reaction (Pt states it has been almost 4 weeks states it is worse in the morning and gets better through out the day  Has been using zyrtec and flonase and no change, eyes burn no itching, no changes in personal items )      Patient does not wear contacts and no glasses and vision stable  Patient has no hx of seasonal allergies  B/l eyes and started about 4 weeks ago  No fever or chills  Patient did use eye wipes for makeup removal about 1 month ago  Review of Systems   Constitutional: Negative  HENT: Negative  Eyes: Negative for photophobia, pain, discharge, redness, itching and visual disturbance  Itchy and burning eyes, no discharge   Respiratory: Negative  Cardiovascular: Negative  Gastrointestinal: Negative  Endocrine: Negative  Genitourinary: Negative  Musculoskeletal: Negative  Skin: Negative  Allergic/Immunologic: Negative  Neurological: Negative  Hematological: Negative  Psychiatric/Behavioral: Negative  Current Outpatient Medications on File Prior to Visit   Medication Sig   • acetaminophen (TYLENOL) 325 mg tablet Take 650 mg by mouth every 6 (six) hours as needed for mild pain   • Calcium Carb-Cholecalciferol (CALCIUM 500+D PO) Take 1 tablet by mouth daily    • cetirizine (ZyrTEC) 10 mg tablet Take 10 mg by mouth daily   • fluticasone (FLONASE) 50 mcg/act nasal spray 1 spray into each nostril daily   • Multiple Vitamin (MULTIVITAMIN) tablet Take 1 tablet by mouth daily   • pseudoephedrine (SUDAFED) 30 mg tablet Take 60 mg by mouth every 4 (four) hours as needed for congestion       Objective     /88 (BP Location: Left arm, Patient Position: Sitting, Cuff Size: Adult)   Pulse 68   Temp (!) 97 2 °F (36 2 °C) (Temporal)   Ht 5' 7 25" (1 708 m)   Wt 82 6 kg (182 lb)   LMP 02/15/2023 (Exact Date)   SpO2 99%   BMI 28 29 kg/m²     Physical Exam  Constitutional:       Appearance: Normal appearance  She is well-developed  HENT:      Head: Normocephalic and atraumatic  Right Ear: External ear normal       Left Ear: External ear normal       Nose: Nose normal    Eyes:      General:         Right eye: No discharge  Left eye: No discharge  Extraocular Movements: Extraocular movements intact  Conjunctiva/sclera: Conjunctivae normal       Pupils: Pupils are equal, round, and reactive to light  Cardiovascular:      Rate and Rhythm: Normal rate and regular rhythm  Heart sounds: Normal heart sounds  Pulmonary:      Effort: Pulmonary effort is normal       Breath sounds: Normal breath sounds  Musculoskeletal:         General: Normal range of motion  Cervical back: Normal range of motion and neck supple  Skin:     General: Skin is warm and dry  Neurological:      Mental Status: She is alert and oriented to person, place, and time        Deep Tendon Reflexes: Reflexes are normal and symmetric     Psychiatric:         Behavior: Behavior normal        Oriana Valdovinos, DO

## 2023-02-15 NOTE — PATIENT INSTRUCTIONS
Patient to start Patanol eye drops as directed and will start prednisone as directed for allergic conjunctivitis  Call if not better and will consider ophthalmology consult if not improved

## 2023-02-27 ENCOUNTER — TELEPHONE (OUTPATIENT)
Dept: FAMILY MEDICINE CLINIC | Facility: CLINIC | Age: 43
End: 2023-02-27

## 2023-02-27 DIAGNOSIS — H10.13 ALLERGIC CONJUNCTIVITIS OF BOTH EYES: Primary | ICD-10-CM

## 2023-02-27 NOTE — TELEPHONE ENCOUNTER
Mirta   Pt competed prednisone on 2/20/23  The bilateral eye swelling returned this Friday 2/24/23  Pt has some post nasal dip ,but has had that    What do you suggest?  Pt's number is [phone]

## 2023-02-27 NOTE — TELEPHONE ENCOUNTER
[facility]  [address] [facility] [place], [place], [address] [facility]   [phone]    I called Cristobal she is aware and was given number to schedule an appointment   physician referral was faxed to [phone]

## 2023-02-27 NOTE — TELEPHONE ENCOUNTER
Earlene Espinoza is aware of your message and recommendations  She does not currently have an ophthalmologist  Please place referral I will call pt with contact info once done

## 2023-03-09 DIAGNOSIS — H10.13 ALLERGIC CONJUNCTIVITIS OF BOTH EYES: ICD-10-CM

## 2023-03-09 RX ORDER — OLOPATADINE HYDROCHLORIDE 2 MG/ML
1 SOLUTION/ DROPS OPHTHALMIC DAILY PRN
Qty: 2.5 ML | Refills: 3 | Status: SHIPPED | OUTPATIENT
Start: 2023-03-09

## 2023-03-09 RX ORDER — OLOPATADINE HYDROCHLORIDE 1 MG/ML
1 SOLUTION/ DROPS OPHTHALMIC 2 TIMES DAILY PRN
Qty: 15 ML | Refills: 1 | Status: SHIPPED | OUTPATIENT
Start: 2023-03-09 | End: 2023-03-09

## 2023-03-13 ENCOUNTER — OFFICE VISIT (OUTPATIENT)
Dept: FAMILY MEDICINE CLINIC | Facility: CLINIC | Age: 43
End: 2023-03-13

## 2023-03-13 VITALS
HEIGHT: 68 IN | OXYGEN SATURATION: 97 % | TEMPERATURE: 96.3 F | SYSTOLIC BLOOD PRESSURE: 124 MMHG | RESPIRATION RATE: 16 BRPM | HEART RATE: 72 BPM | DIASTOLIC BLOOD PRESSURE: 80 MMHG | BODY MASS INDEX: 27.86 KG/M2 | WEIGHT: 183.8 LBS

## 2023-03-13 DIAGNOSIS — Z00.00 HEALTH CARE MAINTENANCE: Primary | ICD-10-CM

## 2023-03-13 DIAGNOSIS — Z13.220 SCREENING FOR HYPERLIPIDEMIA: ICD-10-CM

## 2023-03-13 DIAGNOSIS — I49.9 IRREGULAR HEART RATE: ICD-10-CM

## 2023-03-13 DIAGNOSIS — J30.2 SEASONAL ALLERGIES: ICD-10-CM

## 2023-03-13 NOTE — PATIENT INSTRUCTIONS
Physical Exam (Annual exam ) Rec labs and also EKG today for HR pause at night  Rec also cardiology consult  Rec seeing GYN and getting mammogram as directed  Continue sunscreen and monitor for ticks  Decrease caffeine and also rec cardiology consult for some heart rate pause in evening

## 2023-03-13 NOTE — PROGRESS NOTES
Name: Altagracia Castillo      : 1980      MRN: [id number]  Encounter Provider: Robbin Rivera DO  Encounter Date: 3/13/2023   Encounter department: [address] [place] [address] PRIMARY CARE  Chief Complaint   Patient presents with   • Physical Exam     Annual exam      Patient Instructions    Physical Exam (Annual exam ) Rec labs and also EKG today for HR pause at night  Rec also cardiology consult  Rec seeing GYN and getting mammogram as directed  Continue sunscreen and monitor for ticks  Assessment & Plan     1  Health care maintenance  -     Comprehensive metabolic panel; Future  -     CBC and differential; Future  -     Lipid Panel with Direct LDL reflex; Future  -     TSH, 3rd generation; Future  -     T4, free    2  Seasonal allergies    3  Irregular heart rate  -     TSH, 3rd generation; Future  -     T4, free  -     Ambulatory Referral to Cardiology; Future    4  Screening for hyperlipidemia  -     Comprehensive metabolic panel; Future  -     Lipid Panel with Direct LDL reflex; Future  -     TSH, 3rd generation; Future  -     T4, free           Subjective      Physical Exam (Annual exam ) Exercises and tries to eat healthy and does see GYN and gets mammograms as directed and Dr Vicki Dougherty as well  Has irregular Heart beat at times and has some pauses in heart rate at times  No cp or sob, or ha  The sinus pauses started a couple years ago and has some pauses in HR at times at night  Nonsmoker and drinks 3 drinks per weekend mixed drinks  Sleeps 7-8 hours per night  Review of Systems   Constitutional: Negative  HENT: Negative  Eyes: Negative  Respiratory: Negative  Cardiovascular: Negative  Gastrointestinal: Negative  Endocrine: Negative  Genitourinary: Negative  Musculoskeletal: Negative  Skin: Negative  Allergic/Immunologic: Negative  Neurological: Negative  Hematological: Negative  Psychiatric/Behavioral: Negative          Current Outpatient Medications on File Prior to Visit   Medication Sig   • acetaminophen (TYLENOL) 325 mg tablet Take 650 mg by mouth every 6 (six) hours as needed for mild pain   • Calcium Carb-Cholecalciferol (CALCIUM 500+D PO) Take 1 tablet by mouth daily    • cetirizine (ZyrTEC) 10 mg tablet Take 10 mg by mouth daily   • fluticasone (FLONASE) 50 mcg/act nasal spray 1 spray into each nostril daily   • Multiple Vitamin (MULTIVITAMIN) tablet Take 1 tablet by mouth daily   • olopatadine HCl (PATADAY) 0 2 % opth drops Administer 1 drop to both eyes daily as needed (allergic conjunctivitis)   • pseudoephedrine (SUDAFED) 30 mg tablet Take 60 mg by mouth every 4 (four) hours as needed for congestion   • [DISCONTINUED] predniSONE 10 mg tablet Take 60 mg po day#1, 50 mg po day#2, 40 mg po day#3, 30 mg po day#4, 20 mg po day#5m and 10 mg po day#6 (Patient not taking: Reported on 3/13/2023)       Objective     /80 (BP Location: Left arm, Patient Position: Sitting, Cuff Size: Adult)   Pulse 72   Temp (!) 96 3 °F (35 7 °C) (Temporal)   Resp 16   Ht 5' 8" (1 727 m)   Wt 83 4 kg (183 lb 12 8 oz)   LMP 02/15/2023 (Exact Date)   SpO2 97%   BMI 27 95 kg/m²     Physical Exam  Constitutional:       Appearance: She is well-developed  HENT:      Head: Normocephalic and atraumatic  Right Ear: External ear normal       Left Ear: External ear normal       Nose: Nose normal    Eyes:      Conjunctiva/sclera: Conjunctivae normal       Pupils: Pupils are equal, round, and reactive to light  Cardiovascular:      Rate and Rhythm: Normal rate and regular rhythm  Heart sounds: Normal heart sounds  Pulmonary:      Effort: Pulmonary effort is normal       Breath sounds: Normal breath sounds  Musculoskeletal:         General: Normal range of motion  Cervical back: Normal range of motion and neck supple  Skin:     General: Skin is warm and dry  Neurological:      Mental Status: She is alert and oriented to person, place, and time        Deep Tendon Reflexes: Reflexes are normal and symmetric     Psychiatric:         Behavior: Behavior normal        Robbin Nicole, DO

## 2023-03-15 ENCOUNTER — APPOINTMENT (OUTPATIENT)
Dept: LAB | Facility: HOSPITAL | Age: 43
End: 2023-03-15

## 2023-03-15 DIAGNOSIS — I49.9 IRREGULAR HEART RATE: ICD-10-CM

## 2023-03-15 DIAGNOSIS — Z13.220 SCREENING FOR HYPERLIPIDEMIA: ICD-10-CM

## 2023-03-15 DIAGNOSIS — Z00.00 HEALTH CARE MAINTENANCE: ICD-10-CM

## 2023-03-15 LAB
ALBUMIN SERPL BCP-MCNC: 4.5 G/DL (ref 3.5–5)
ALP SERPL-CCNC: 55 U/L (ref 34–104)
ALT SERPL W P-5'-P-CCNC: 13 U/L (ref 7–52)
ANION GAP SERPL CALCULATED.3IONS-SCNC: 9 MMOL/L (ref 4–13)
AST SERPL W P-5'-P-CCNC: 15 U/L (ref 13–39)
BASOPHILS # BLD AUTO: 0.03 THOUSANDS/ÂΜL (ref 0–0.1)
BASOPHILS NFR BLD AUTO: 1 % (ref 0–1)
BILIRUB SERPL-MCNC: 0.62 MG/DL (ref 0.2–1)
BUN SERPL-MCNC: 15 MG/DL (ref 5–25)
CALCIUM SERPL-MCNC: 9.5 MG/DL (ref 8.4–10.2)
CHLORIDE SERPL-SCNC: 104 MMOL/L (ref 96–108)
CHOLEST SERPL-MCNC: 202 MG/DL
CO2 SERPL-SCNC: 27 MMOL/L (ref 21–32)
CREAT SERPL-MCNC: 0.95 MG/DL (ref 0.6–1.3)
EOSINOPHIL # BLD AUTO: 0.13 THOUSAND/ÂΜL (ref 0–0.61)
EOSINOPHIL NFR BLD AUTO: 3 % (ref 0–6)
ERYTHROCYTE [DISTWIDTH] IN BLOOD BY AUTOMATED COUNT: 13.1 % (ref 11.6–15.1)
GFR SERPL CREATININE-BSD FRML MDRD: 74 ML/MIN/1.73SQ M
GLUCOSE P FAST SERPL-MCNC: 92 MG/DL (ref 65–99)
HCT VFR BLD AUTO: 40.7 % (ref 34.8–46.1)
HDLC SERPL-MCNC: 75 MG/DL
HGB BLD-MCNC: 13.1 G/DL (ref 11.5–15.4)
IMM GRANULOCYTES # BLD AUTO: 0.01 THOUSAND/UL (ref 0–0.2)
IMM GRANULOCYTES NFR BLD AUTO: 0 % (ref 0–2)
LDLC SERPL CALC-MCNC: 105 MG/DL (ref 0–100)
LYMPHOCYTES # BLD AUTO: 1.58 THOUSANDS/ÂΜL (ref 0.6–4.47)
LYMPHOCYTES NFR BLD AUTO: 33 % (ref 14–44)
MCH RBC QN AUTO: 30.8 PG (ref 26.8–34.3)
MCHC RBC AUTO-ENTMCNC: 32.2 G/DL (ref 31.4–37.4)
MCV RBC AUTO: 96 FL (ref 82–98)
MONOCYTES # BLD AUTO: 0.58 THOUSAND/ÂΜL (ref 0.17–1.22)
MONOCYTES NFR BLD AUTO: 12 % (ref 4–12)
NEUTROPHILS # BLD AUTO: 2.49 THOUSANDS/ÂΜL (ref 1.85–7.62)
NEUTS SEG NFR BLD AUTO: 51 % (ref 43–75)
NRBC BLD AUTO-RTO: 0 /100 WBCS
PLATELET # BLD AUTO: 263 THOUSANDS/UL (ref 149–390)
PMV BLD AUTO: 10.1 FL (ref 8.9–12.7)
POTASSIUM SERPL-SCNC: 4.2 MMOL/L (ref 3.5–5.3)
PROT SERPL-MCNC: 7.2 G/DL (ref 6.4–8.4)
RBC # BLD AUTO: 4.25 MILLION/UL (ref 3.81–5.12)
SODIUM SERPL-SCNC: 140 MMOL/L (ref 135–147)
T4 FREE SERPL-MCNC: 0.96 NG/DL (ref 0.76–1.46)
TRIGL SERPL-MCNC: 109 MG/DL
TSH SERPL DL<=0.05 MIU/L-ACNC: 1.58 UIU/ML (ref 0.45–4.5)
WBC # BLD AUTO: 4.82 THOUSAND/UL (ref 4.31–10.16)

## 2023-03-20 ENCOUNTER — TELEPHONE (OUTPATIENT)
Dept: SURGICAL ONCOLOGY | Facility: CLINIC | Age: 43
End: 2023-03-20

## 2023-03-20 NOTE — TELEPHONE ENCOUNTER
Called patient and explained the need to reschedule her appointment with Dr Frederick Christensen on 4/19/23  She was agreeable to rescheduling [address]

## 2023-04-05 ENCOUNTER — HOSPITAL ENCOUNTER (OUTPATIENT)
Dept: MAMMOGRAPHY | Facility: MEDICAL CENTER | Age: 43
Discharge: HOME | End: 2023-04-05

## 2023-04-05 VITALS — HEIGHT: 68 IN | WEIGHT: 183 LBS | BODY MASS INDEX: 27.74 KG/M2

## 2023-04-05 DIAGNOSIS — Z12.31 SCREENING MAMMOGRAM FOR HIGH-RISK PATIENT: ICD-10-CM

## 2023-08-25 ENCOUNTER — OFFICE VISIT (OUTPATIENT)
Dept: FAMILY MEDICINE CLINIC | Facility: CLINIC | Age: 43
End: 2023-08-25
Payer: COMMERCIAL

## 2023-08-25 VITALS
BODY MASS INDEX: 28.56 KG/M2 | TEMPERATURE: 96.3 F | HEIGHT: 67 IN | WEIGHT: 182 LBS | OXYGEN SATURATION: 98 % | DIASTOLIC BLOOD PRESSURE: 88 MMHG | HEART RATE: 65 BPM | SYSTOLIC BLOOD PRESSURE: 120 MMHG

## 2023-08-25 DIAGNOSIS — M54.50 ACUTE BILATERAL LOW BACK PAIN WITHOUT SCIATICA: Primary | ICD-10-CM

## 2023-08-25 PROCEDURE — 99214 OFFICE O/P EST MOD 30 MIN: CPT | Performed by: NURSE PRACTITIONER

## 2023-08-25 RX ORDER — PREDNISONE 10 MG/1
TABLET ORAL
Qty: 21 TABLET | Refills: 0 | Status: SHIPPED | OUTPATIENT
Start: 2023-08-25

## 2023-08-25 NOTE — PROGRESS NOTES
Name: Alexei Roberts      : 1980      MRN: [id number]  Encounter Provider: BAHMAN Guerra  Encounter Date: 2023   Encounter department: [address] [place] [address]     1. Acute bilateral low back pain without sciatica  -     predniSONE 10 mg tablet; Day 1: 6 tabs  Day 2: 5 tabs Day 3: 4 tabs  Day 4: 3 tabs  Day 5: 2 tabs  Day 6: 1 tab    Start prednisone, this is the steroid. It will be 6 pills today and decrease by 1 pill each day for the following 6 days. So it will be 6-5-4-3-2-1. Take this with food as it may upset your stomach. It's best to take it earlier in the day otherwise it may keep you up at night. Do not take this with NSAIDs such as advil/ibuprofen/advil/aleve/motrin. Start lower back stretching as tolerated. If no improvement/resolution- PT is next step. Declines muscle relaxant as this was not helpful and didn't feel well on it. Please call the office if you are experiencing any worsening of symptoms or no symptom improvement. Subjective      Here for evaluation of low back pain. Has had pain L lower back prior- but not to this severity. Was doing dead lifts and squats when it happened last saturday. This is located across the whole bottom of her back. Has tried lidoderm patches, muscle relaxant (she tried both robaxin and flexeril), tylenol and motrin. No relief from them. No radiating pain. Rarely she will get a shooting pain up her back. Sitting is the worse for the pain. No changes in bladder/bowel function. Review of Systems   Constitutional: Negative for chills and fever. Eyes: Negative for discharge. Respiratory: Negative for shortness of breath. Cardiovascular: Negative for chest pain. Gastrointestinal: Negative for constipation and diarrhea. Genitourinary: Negative for difficulty urinating. Musculoskeletal: Positive for back pain. Negative for joint swelling. Skin: Negative for rash.    Neurological: Negative for headaches. Hematological: Negative for adenopathy. Psychiatric/Behavioral: The patient is not nervous/anxious. Current Outpatient Medications on File Prior to Visit   Medication Sig   • acetaminophen (TYLENOL) 325 mg tablet Take 650 mg by mouth every 6 (six) hours as needed for mild pain   • Calcium Carb-Cholecalciferol (CALCIUM 500+D PO) Take 1 tablet by mouth daily    • cetirizine (ZyrTEC) 10 mg tablet Take 10 mg by mouth daily   • fluticasone (FLONASE) 50 mcg/act nasal spray 1 spray into each nostril daily   • Multiple Vitamin (MULTIVITAMIN) tablet Take 1 tablet by mouth daily   • olopatadine HCl (PATADAY) 0.2 % opth drops Administer 1 drop to both eyes daily as needed (allergic conjunctivitis)   • [DISCONTINUED] pseudoephedrine (SUDAFED) 30 mg tablet Take 60 mg by mouth every 4 (four) hours as needed for congestion (Patient not taking: Reported on 8/25/2023)       Objective     /88 (BP Location: Left arm, Patient Position: Sitting, Cuff Size: Adult)   Pulse 65   Temp (!) 96.3 °F (35.7 °C) (Temporal)   Ht 5' 7" (1.702 m)   Wt 82.6 kg (182 lb)   SpO2 98%   BMI 28.51 kg/m²     Physical Exam  Vitals and nursing note reviewed. Constitutional:       General: She is not in acute distress. Appearance: She is well-developed. She is not diaphoretic. HENT:      Head: Normocephalic and atraumatic. Right Ear: External ear normal.      Left Ear: External ear normal.   Eyes:      General: Lids are normal.         Right eye: No discharge. Left eye: No discharge. Conjunctiva/sclera: Conjunctivae normal.   Cardiovascular:      Rate and Rhythm: Normal rate and regular rhythm. Heart sounds: No murmur heard. Pulmonary:      Effort: Pulmonary effort is normal. No respiratory distress. Breath sounds: Normal breath sounds. No wheezing. Musculoskeletal:         General: No deformity. Cervical back: Normal and neck supple. No tenderness.       Thoracic back: Normal. No tenderness. Lumbar back: No tenderness or bony tenderness. Decreased range of motion. Positive right straight leg raise test and positive left straight leg raise test.   Skin:     General: Skin is warm and dry. Neurological:      Mental Status: She is alert and oriented to person, place, and time. Psychiatric:         Speech: Speech normal.         Behavior: Behavior normal.         Thought Content:  Thought content normal.         Judgment: Judgment normal.       BAHMAN Parrish

## 2023-08-25 NOTE — PATIENT INSTRUCTIONS
Start prednisone, this is the steroid. It will be 6 pills today and decrease by 1 pill each day for the following 6 days. So it will be 6-5-4-3-2-1. Take this with food as it may upset your stomach. It's best to take it earlier in the day otherwise it may keep you up at night. Do not take this with NSAIDs such as advil/ibuprofen/advil/aleve/motrin. Start lower back stretching as tolerated. If no improvement/resolution- PT is next step. Please call the office if you are experiencing any worsening of symptoms or no symptom improvement.

## 2023-10-18 ENCOUNTER — TELEPHONE (OUTPATIENT)
Dept: FAMILY MEDICINE CLINIC | Facility: CLINIC | Age: 43
End: 2023-10-18

## 2023-10-18 ENCOUNTER — TELEPHONE (OUTPATIENT)
Dept: HEMATOLOGY ONCOLOGY | Facility: CLINIC | Age: 43
End: 2023-10-18

## 2023-10-18 DIAGNOSIS — Z00.00 HEALTH CARE MAINTENANCE: Primary | ICD-10-CM

## 2023-10-18 NOTE — TELEPHONE ENCOUNTER
Appointment Change  Cancel, Reschedule, Change to Virtual      Who are you speaking with? Patient   If it is not the patient, is the caller listed on the communication consent form? N/A   Which provider is the appointment scheduled with? BAHMAN Hooker   When was the original appointment scheduled? Please list date and time 10/27/23 3:30pm   At which location is the appointment scheduled to take place? [facility]   Was the appointment rescheduled? Was the appointment changed from an in person visit to a virtual visit? If so, please list the details of the change. 11/21/23 2:30pm Anna Sandoval   What is the reason for the appointment change? Patient's MRI is scheduled for 11/16/23 and patient wanted to be seen after the MRI. Was STAR transport scheduled? No   Does STAR transport need to be scheduled for the new visit (if applicable) No   Does the patient need an infusion appointment rescheduled? No   Does the patient have an upcoming infusion appointment scheduled? If so, when? No   Is the patient undergoing chemotherapy? No   For appointments cancelled with less than 24 hours:  Was the no-show policy reviewed?  Yes

## 2023-10-19 ENCOUNTER — APPOINTMENT (OUTPATIENT)
Dept: LAB | Facility: HOSPITAL | Age: 43
End: 2023-10-19
Payer: COMMERCIAL

## 2023-10-19 DIAGNOSIS — Z00.00 HEALTH CARE MAINTENANCE: ICD-10-CM

## 2023-10-19 PROCEDURE — 36415 COLL VENOUS BLD VENIPUNCTURE: CPT

## 2023-10-19 PROCEDURE — 86706 HEP B SURFACE ANTIBODY: CPT

## 2023-10-19 PROCEDURE — 86787 VARICELLA-ZOSTER ANTIBODY: CPT

## 2023-10-20 LAB
HBV SURFACE AB SER-ACNC: 8.09 MIU/ML
VZV IGG SER QL IA: NORMAL

## 2023-10-23 ENCOUNTER — CLINICAL SUPPORT (OUTPATIENT)
Dept: FAMILY MEDICINE CLINIC | Facility: CLINIC | Age: 43
End: 2023-10-23
Payer: COMMERCIAL

## 2023-10-23 DIAGNOSIS — Z11.1 SCREENING FOR TUBERCULOSIS: Primary | ICD-10-CM

## 2023-10-23 DIAGNOSIS — Z23 ENCOUNTER FOR IMMUNIZATION: ICD-10-CM

## 2023-10-23 PROCEDURE — 90746 HEPB VACCINE 3 DOSE ADULT IM: CPT

## 2023-10-23 PROCEDURE — G0010 ADMIN HEPATITIS B VACCINE: HCPCS

## 2023-10-25 ENCOUNTER — APPOINTMENT (OUTPATIENT)
Dept: LAB | Facility: HOSPITAL | Age: 43
End: 2023-10-25
Payer: COMMERCIAL

## 2023-10-25 DIAGNOSIS — Z11.1 SCREENING FOR TUBERCULOSIS: ICD-10-CM

## 2023-10-25 PROCEDURE — 86480 TB TEST CELL IMMUN MEASURE: CPT

## 2023-10-25 PROCEDURE — 36415 COLL VENOUS BLD VENIPUNCTURE: CPT

## 2023-10-26 LAB
GAMMA INTERFERON BACKGROUND BLD IA-ACNC: <0 IU/ML
M TB IFN-G BLD-IMP: NEGATIVE
M TB IFN-G CD4+ BCKGRND COR BLD-ACNC: 0.01 IU/ML
M TB IFN-G CD4+ BCKGRND COR BLD-ACNC: 0.04 IU/ML
MITOGEN IGNF BCKGRD COR BLD-ACNC: 10 IU/ML

## 2023-11-16 ENCOUNTER — HOSPITAL ENCOUNTER (OUTPATIENT)
Dept: MRI IMAGING | Facility: HOSPITAL | Age: 43
End: 2023-11-16
Payer: COMMERCIAL

## 2023-11-16 DIAGNOSIS — Z91.89 AT INCREASED RISK OF BREAST CANCER: ICD-10-CM

## 2023-11-16 DIAGNOSIS — N60.92 ATYPICAL LOBULAR HYPERPLASIA OF LEFT BREAST: ICD-10-CM

## 2023-11-16 PROCEDURE — C8937 CAD BREAST MRI: HCPCS

## 2023-11-16 PROCEDURE — G1004 CDSM NDSC: HCPCS

## 2023-11-16 PROCEDURE — C8908 MRI W/O FOL W/CONT, BREAST,: HCPCS

## 2023-11-16 PROCEDURE — 77049 MRI BREAST C-+ W/CAD BI: CPT

## 2023-11-16 PROCEDURE — A9585 GADOBUTROL INJECTION: HCPCS | Performed by: FAMILY MEDICINE

## 2023-11-16 RX ORDER — GADOBUTROL 604.72 MG/ML
8 INJECTION INTRAVENOUS
Status: COMPLETED | OUTPATIENT
Start: 2023-11-16 | End: 2023-11-16

## 2023-11-16 RX ADMIN — GADOBUTROL 8 ML: 604.72 INJECTION INTRAVENOUS at 17:03

## 2023-11-21 ENCOUNTER — OFFICE VISIT (OUTPATIENT)
Dept: SURGICAL ONCOLOGY | Facility: CLINIC | Age: 43
End: 2023-11-21
Payer: COMMERCIAL

## 2023-11-21 VITALS
DIASTOLIC BLOOD PRESSURE: 80 MMHG | BODY MASS INDEX: 28.88 KG/M2 | WEIGHT: 184 LBS | TEMPERATURE: 97.3 F | OXYGEN SATURATION: 99 % | HEIGHT: 67 IN | SYSTOLIC BLOOD PRESSURE: 118 MMHG | RESPIRATION RATE: 16 BRPM | HEART RATE: 67 BPM

## 2023-11-21 DIAGNOSIS — Z91.89 AT INCREASED RISK OF BREAST CANCER: Primary | ICD-10-CM

## 2023-11-21 DIAGNOSIS — Z12.31 VISIT FOR SCREENING MAMMOGRAM: ICD-10-CM

## 2023-11-21 DIAGNOSIS — R92.30 DENSE BREAST TISSUE: ICD-10-CM

## 2023-11-21 DIAGNOSIS — N60.92 ATYPICAL LOBULAR HYPERPLASIA OF LEFT BREAST: ICD-10-CM

## 2023-11-21 PROCEDURE — 99213 OFFICE O/P EST LOW 20 MIN: CPT

## 2023-11-21 NOTE — PROGRESS NOTES
Surgical Oncology Follow Up       [facility] SURGICAL ONCOLOGY [facility] [address]    [address] [place] [address]  1980  [id number]  [facility] SURGICAL ONCOLOGY [facility] [address]    Chief Complaint   Patient presents with    Follow-up       Assessment/Plan:  1. At increased risk of breast cancer  - 1 year follow up  - MRI breast bilateral w and wo contrast w cad; Future    2. Dense breast tissue    3. Atypical lobular hyperplasia of left breast  - MRI breast bilateral w and wo contrast w cad; Future    4. Visit for screening mammogram  - Mammo screening bilateral w 3d & cad; Future       Discussion/Summary:  Patient is a 51-year-old female that was diagnosed with atypical lobular hyperplasia in 2020. This was found incidentally with excisional biopsy. We have been following her with alternating annual breast MRI and annual 3D mammography. She had a bilateral screening mammogram on 4/5/2023 which was BI-RADS 2 category 3 density. She recently had a bilateral breast MRI on 11/16/2023 which was benign. It is reasonable to see her annually. There were no concerns on her cbe. She denies changes in family hx. I will see the patient back in 1 year or sooner should the need arise. She was instructed to call with any questions or concerns prior to this time. All questions were answered today. History of Present Illness:     Oncology History    No history exists.        -Interval History:  Patient is a 51-year-old female that was diagnosed with atypical lobular hyperplasia in 2020. She had a bilateral screening mammogram on 4/5/2023 which was BI-RADS 2 category 3 density. She recently had a bilateral breast MRI on 11/16/2023 which was benign. She denies breast changes or changes with her family hx.      Review of Systems:  Review of Systems   Constitutional:  Negative for activity change, appetite change, fatigue and unexpected weight change. Respiratory:  Negative for cough and shortness of breath. Cardiovascular:  Negative for chest pain. Gastrointestinal:  Negative for abdominal pain, diarrhea, nausea and vomiting. Endocrine: Negative for heat intolerance. Musculoskeletal:  Negative for arthralgias, back pain and myalgias. Skin:  Negative for rash. Neurological:  Negative for weakness and headaches. Hematological:  Negative for adenopathy.        Patient Active Problem List   Diagnosis    Atypical lobular hyperplasia of left breast    Screening mammogram for high-risk patient    Dense breast tissue    At increased risk of breast cancer    Chronic migraine without aura, with status migrainosus     Past Medical History:   Diagnosis Date    GERD (gastroesophageal reflux disease)     Headache     Hypotension     Irregular heart beat     pt states she can feel her heart skip a beat or pause sometimes    Migraines      Past Surgical History:   Procedure Laterality Date    APPENDECTOMY      BREAST BIOPSY Left 12 years ago    benign    BREAST CYST EXCISION Left     12:00 Benign Fibroadenoma    CERVICAL BIOPSY  W/ LOOP ELECTRODE EXCISION       SECTION      CHEST WALL BIOPSY Left 3/13/2020    Procedure: EXC  Bx of left breast with intra op U/S;  Surgeon: Ed Matamoros MD;  Location: [facility];  Service: Surgical Oncology    DORSAL COMPARTMENT RELEASE Bilateral     EGD      KNEE ARTHROSCOPY W/ ACL RECONSTRUCTION      TONSILLECTOMY      US GUIDED BREAST BIOPSY LEFT COMPLETE Left 2020    US GUIDED BREAST BIOPSY RIGHT COMPLETE Right 10/30/2020    WISDOM TOOTH EXTRACTION       Family History   Problem Relation Age of Onset    Prostate cancer Maternal Grandfather 79    Lung cancer Maternal Grandfather 79    No Known Problems Mother     No Known Problems Father     No Known Problems Daughter     No Known Problems Maternal Grandmother     No Known Problems Paternal Grandmother     No Known Problems Paternal Grandfather     No Known Problems Brother     No Known Problems Daughter     No Known Problems Son     No Known Problems Son     No Known Problems Paternal Aunt     Breast cancer Paternal Aunt         62's    No Known Problems Paternal Aunt      Social History     Socioeconomic History    Marital status: /Civil Union     Spouse name: Not on file    Number of children: Not on file    Years of education: Not on file    Highest education level: Not on file   Occupational History    Not on file   Tobacco Use    Smoking status: Never    Smokeless tobacco: Never   Vaping Use    Vaping Use: Never used   Substance and Sexual Activity    Alcohol use: Yes     Comment: soically    Drug use: No    Sexual activity: Not on file   Other Topics Concern    Not on file   Social History Narrative    Not on file     Social Determinants of Health     Financial Resource Strain: Not on file   Food Insecurity: Not on file   Transportation Needs: Not on file   Physical Activity: Not on file   Stress: Not on file   Social Connections: Not on file   Intimate Partner Violence: Not on file   Housing Stability: Not on file       Current Outpatient Medications:     Multiple Vitamin (MULTIVITAMIN) tablet, Take 1 tablet by mouth daily, Disp: , Rfl:     acetaminophen (TYLENOL) 325 mg tablet, Take 650 mg by mouth every 6 (six) hours as needed for mild pain (Patient not taking: Reported on 11/21/2023), Disp: , Rfl:     Calcium Carb-Cholecalciferol (CALCIUM 500+D PO), Take 1 tablet by mouth daily  (Patient not taking: Reported on 11/21/2023), Disp: , Rfl:     cetirizine (ZyrTEC) 10 mg tablet, Take 10 mg by mouth daily (Patient not taking: Reported on 11/21/2023), Disp: , Rfl:     fluticasone (FLONASE) 50 mcg/act nasal spray, 1 spray into each nostril daily (Patient not taking: Reported on 11/21/2023), Disp: , Rfl:     olopatadine HCl (PATADAY) 0.2 % opth drops, Administer 1 drop to both eyes daily as needed (allergic conjunctivitis) (Patient not taking: Reported on 11/21/2023), Disp: 2.5 mL, Rfl: 3    predniSONE 10 mg tablet, Day 1: 6 tabs  Day 2: 5 tabs Day 3: 4 tabs  Day 4: 3 tabs  Day 5: 2 tabs  Day 6: 1 tab (Patient not taking: Reported on 11/21/2023), Disp: 21 tablet, Rfl: 0  No Known Allergies  Vitals:    11/21/23 1435   BP: 118/80   Pulse: 67   Resp: 16   Temp: (!) 97.3 °F (36.3 °C)   SpO2: 99%       Physical Exam  Constitutional:       General: She is not in acute distress. Appearance: Normal appearance. Cardiovascular:      Rate and Rhythm: Normal rate and regular rhythm. Pulses: Normal pulses. Heart sounds: Normal heart sounds. Pulmonary:      Effort: Pulmonary effort is normal.      Breath sounds: Normal breath sounds. Chest:      Chest wall: No mass. Breasts:     Right: No swelling, bleeding, inverted nipple, mass, nipple discharge, skin change or tenderness. Left: No swelling, bleeding, inverted nipple, mass, nipple discharge, skin change or tenderness. Abdominal:      General: Abdomen is flat. Palpations: Abdomen is soft. Lymphadenopathy:      Upper Body:      Right upper body: No supraclavicular, axillary or pectoral adenopathy. Left upper body: No supraclavicular, axillary or pectoral adenopathy. Skin:     General: Skin is warm. Neurological:      General: No focal deficit present. Mental Status: She is alert and oriented to person, place, and time. Psychiatric:         Mood and Affect: Mood normal.         Behavior: Behavior normal.           Results:    Imaging  MRI breast bilateral w and wo contrast w cad    Result Date: 11/17/2023  Narrative: DIAGNOSIS: At increased risk of breast cancer;  Atypical lobular hyperplasia of left breast TECHNIQUE: MRI of both breasts was performed in axial planes utilizing a combination of localizer, axial T2 STIR, Axial T1 FSPGR in phase, and axial dynamic 3D fat suppressed gradient-echo T1 sequences (VIBRANT) before and after contrast administration at multiple time points. Subtraction images were generated. This exam was read in conjunction with barcoo software. MEDICATIONS ADMINISTERED: Gadobutrol injection (SINGLE-DOSE) SOLN 8 mL, Total Given: 8 mL (1 dose) Intravenous contrast was administered at 2cc/sec, without documented contrast reaction. COMPARISONS: Prior breast imaging dated: 04/05/2023, 11/10/2022, 04/04/2022, 10/15/2021, 04/14/2021, 04/14/2021, 04/01/2021, 10/30/2020, 10/30/2020, 10/30/2020, 10/01/2020, 02/04/2020, 01/23/2020, 07/22/2019, 01/22/2019, 01/22/2019, 01/22/2019, 07/18/2017, 07/18/2017, and 07/18/2017 RELEVANT HISTORY: Family Breast Cancer History: History of breast cancer in Paternal Aunt. Family Medical History: Family medical history includes breast cancer in paternal aunt. Personal History: Hormone history includes birth control. Surgical history includes breast biopsy and breast excisional biopsy. No known relevant medical history. RISK ASSESSMENT: 5 Year Tyrer-Cuzick: 1.14 % 10 Year Tyrer-Cuzick: 2.78 % Lifetime Tyrer-Cuzick: 18.08 %  TISSUE DENSITY: FGT: The breasts have heterogeneous fibroglandular tissue. BPE: No BPE recorded. INDICATION: Franklin Wilson is a 43 y.o. female presenting for high rescreening breast MRI. History of left breast [facility]. FINDINGS: Bilateral There are no suspicious enhancing masses or suspicious areas of non-mass enhancement. Previously biopsied right breast upper outer quadrant oval circumscribed mass is not significantly changed. This currently measures 9 x 6 mm, previously 6 x 5 mm on 10/15/2021. There is no axillary or internal mammary adenopathy. Impression:  1. No MR evidence of malignancy in either breast. 2.  Patient will be due for annual screening mammography after 8/5/2024. 3.  Annual high risk screening breast MRI is recommended.  ASSESSMENT/BI-RADS CATEGORY: Left: 1 - Negative Right: 2 - Benign Overall: 2 - Benign RECOMMENDATION:      - Continue annual screening mammography for both breasts. - Breast MRI Screening in 1 year for both breasts. Workstation ID: [id number]      I reviewed the above imaging data. Advance Care Planning/Advance Directives:  Discussed disease status, cancer treatment plans and/or cancer treatment goals with the patient.

## 2023-11-22 ENCOUNTER — CLINICAL SUPPORT (OUTPATIENT)
Dept: FAMILY MEDICINE CLINIC | Facility: CLINIC | Age: 43
End: 2023-11-22
Payer: COMMERCIAL

## 2023-11-22 DIAGNOSIS — Z23 ENCOUNTER FOR IMMUNIZATION: Primary | ICD-10-CM

## 2023-11-22 PROCEDURE — 90746 HEPB VACCINE 3 DOSE ADULT IM: CPT

## 2023-11-22 PROCEDURE — G0010 ADMIN HEPATITIS B VACCINE: HCPCS

## 2024-03-26 ENCOUNTER — APPOINTMENT (EMERGENCY)
Dept: CT IMAGING | Facility: HOSPITAL | Age: 44
DRG: 059 | End: 2024-03-26
Payer: COMMERCIAL

## 2024-03-26 ENCOUNTER — HOSPITAL ENCOUNTER (INPATIENT)
Facility: HOSPITAL | Age: 44
LOS: 3 days | Discharge: HOME | DRG: 059 | End: 2024-03-29
Attending: EMERGENCY MEDICINE | Admitting: INTERNAL MEDICINE
Payer: COMMERCIAL

## 2024-03-26 ENCOUNTER — APPOINTMENT (EMERGENCY)
Dept: MRI IMAGING | Facility: HOSPITAL | Age: 44
DRG: 059 | End: 2024-03-26
Payer: COMMERCIAL

## 2024-03-26 DIAGNOSIS — R90.89 ABNORMAL FINDING ON MRI OF BRAIN: ICD-10-CM

## 2024-03-26 DIAGNOSIS — H53.8 BLURRED VISION, RIGHT EYE: ICD-10-CM

## 2024-03-26 DIAGNOSIS — D72.829 LEUKOCYTOSIS, UNSPECIFIED TYPE: ICD-10-CM

## 2024-03-26 DIAGNOSIS — G97.1 POST LUMBAR PUNCTURE HEADACHE: ICD-10-CM

## 2024-03-26 DIAGNOSIS — H46.9 OPTIC NEURITIS: Primary | ICD-10-CM

## 2024-03-26 DIAGNOSIS — G97.1: ICD-10-CM

## 2024-03-26 PROBLEM — K21.9 GERD (GASTROESOPHAGEAL REFLUX DISEASE): Status: ACTIVE | Noted: 2024-03-26

## 2024-03-26 LAB
ALBUMIN SERPL BCP-MCNC: 4.8 G/DL (ref 3.5–5)
ALP SERPL-CCNC: 53 U/L (ref 34–104)
ALT SERPL W P-5'-P-CCNC: 11 U/L (ref 7–52)
ANION GAP SERPL CALCULATED.3IONS-SCNC: 9 MMOL/L (ref 4–13)
AST SERPL W P-5'-P-CCNC: 14 U/L (ref 13–39)
BASOPHILS # BLD AUTO: 0.04 THOUSANDS/ÂΜL (ref 0–0.1)
BASOPHILS NFR BLD AUTO: 1 % (ref 0–1)
BILIRUB SERPL-MCNC: 0.43 MG/DL (ref 0.2–1)
BUN SERPL-MCNC: 17 MG/DL (ref 5–25)
CALCIUM SERPL-MCNC: 9.3 MG/DL (ref 8.4–10.2)
CHLORIDE SERPL-SCNC: 103 MMOL/L (ref 96–108)
CO2 SERPL-SCNC: 26 MMOL/L (ref 21–32)
CREAT SERPL-MCNC: 0.94 MG/DL (ref 0.6–1.3)
EOSINOPHIL # BLD AUTO: 0.1 THOUSAND/ÂΜL (ref 0–0.61)
EOSINOPHIL NFR BLD AUTO: 1 % (ref 0–6)
ERYTHROCYTE [DISTWIDTH] IN BLOOD BY AUTOMATED COUNT: 13.5 % (ref 11.6–15.1)
GFR SERPL CREATININE-BSD FRML MDRD: 74 ML/MIN/1.73SQ M
GLUCOSE SERPL-MCNC: 85 MG/DL (ref 65–140)
HCG SERPL QL: NEGATIVE
HCT VFR BLD AUTO: 39.5 % (ref 34.8–46.1)
HGB BLD-MCNC: 13.2 G/DL (ref 11.5–15.4)
IMM GRANULOCYTES # BLD AUTO: 0.02 THOUSAND/UL (ref 0–0.2)
IMM GRANULOCYTES NFR BLD AUTO: 0 % (ref 0–2)
LYMPHOCYTES # BLD AUTO: 1.97 THOUSANDS/ÂΜL (ref 0.6–4.47)
LYMPHOCYTES NFR BLD AUTO: 26 % (ref 14–44)
MCH RBC QN AUTO: 31 PG (ref 26.8–34.3)
MCHC RBC AUTO-ENTMCNC: 33.4 G/DL (ref 31.4–37.4)
MCV RBC AUTO: 93 FL (ref 82–98)
MONOCYTES # BLD AUTO: 0.52 THOUSAND/ÂΜL (ref 0.17–1.22)
MONOCYTES NFR BLD AUTO: 7 % (ref 4–12)
NEUTROPHILS # BLD AUTO: 4.83 THOUSANDS/ÂΜL (ref 1.85–7.62)
NEUTS SEG NFR BLD AUTO: 65 % (ref 43–75)
NRBC BLD AUTO-RTO: 0 /100 WBCS
PLATELET # BLD AUTO: 254 THOUSANDS/UL (ref 149–390)
PMV BLD AUTO: 10.2 FL (ref 8.9–12.7)
POTASSIUM SERPL-SCNC: 3.5 MMOL/L (ref 3.5–5.3)
PROT SERPL-MCNC: 7.5 G/DL (ref 6.4–8.4)
RBC # BLD AUTO: 4.26 MILLION/UL (ref 3.81–5.12)
SODIUM SERPL-SCNC: 138 MMOL/L (ref 135–147)
WBC # BLD AUTO: 7.48 THOUSAND/UL (ref 4.31–10.16)

## 2024-03-26 PROCEDURE — 84703 CHORIONIC GONADOTROPIN ASSAY: CPT | Performed by: EMERGENCY MEDICINE

## 2024-03-26 PROCEDURE — 70496 CT ANGIOGRAPHY HEAD: CPT

## 2024-03-26 PROCEDURE — 70498 CT ANGIOGRAPHY NECK: CPT

## 2024-03-26 PROCEDURE — 99284 EMERGENCY DEPT VISIT MOD MDM: CPT

## 2024-03-26 PROCEDURE — 36415 COLL VENOUS BLD VENIPUNCTURE: CPT | Performed by: EMERGENCY MEDICINE

## 2024-03-26 PROCEDURE — 70553 MRI BRAIN STEM W/O & W/DYE: CPT

## 2024-03-26 PROCEDURE — 96365 THER/PROPH/DIAG IV INF INIT: CPT

## 2024-03-26 PROCEDURE — 80053 COMPREHEN METABOLIC PANEL: CPT | Performed by: EMERGENCY MEDICINE

## 2024-03-26 PROCEDURE — 96375 TX/PRO/DX INJ NEW DRUG ADDON: CPT

## 2024-03-26 PROCEDURE — 70450 CT HEAD/BRAIN W/O DYE: CPT

## 2024-03-26 PROCEDURE — NC001 PR NO CHARGE: Performed by: STUDENT IN AN ORGANIZED HEALTH CARE EDUCATION/TRAINING PROGRAM

## 2024-03-26 PROCEDURE — C9113 INJ PANTOPRAZOLE SODIUM, VIA: HCPCS | Performed by: INTERNAL MEDICINE

## 2024-03-26 PROCEDURE — A9585 GADOBUTROL INJECTION: HCPCS | Performed by: EMERGENCY MEDICINE

## 2024-03-26 PROCEDURE — 70543 MRI ORBT/FAC/NCK W/O &W/DYE: CPT

## 2024-03-26 PROCEDURE — 99223 1ST HOSP IP/OBS HIGH 75: CPT

## 2024-03-26 PROCEDURE — 85025 COMPLETE CBC W/AUTO DIFF WBC: CPT | Performed by: EMERGENCY MEDICINE

## 2024-03-26 PROCEDURE — 99285 EMERGENCY DEPT VISIT HI MDM: CPT | Performed by: EMERGENCY MEDICINE

## 2024-03-26 RX ORDER — METHYLPREDNISOLONE SODIUM SUCCINATE 125 MG/2ML
1000 INJECTION, POWDER, LYOPHILIZED, FOR SOLUTION INTRAMUSCULAR; INTRAVENOUS DAILY
Status: DISCONTINUED | OUTPATIENT
Start: 2024-03-26 | End: 2024-03-26

## 2024-03-26 RX ORDER — KETOROLAC TROMETHAMINE 30 MG/ML
30 INJECTION, SOLUTION INTRAMUSCULAR; INTRAVENOUS ONCE
Status: COMPLETED | OUTPATIENT
Start: 2024-03-26 | End: 2024-03-26

## 2024-03-26 RX ORDER — DIPHENHYDRAMINE HYDROCHLORIDE 50 MG/ML
25 INJECTION INTRAMUSCULAR; INTRAVENOUS ONCE
Status: COMPLETED | OUTPATIENT
Start: 2024-03-26 | End: 2024-03-26

## 2024-03-26 RX ORDER — METOCLOPRAMIDE HYDROCHLORIDE 5 MG/ML
10 INJECTION INTRAMUSCULAR; INTRAVENOUS ONCE
Status: COMPLETED | OUTPATIENT
Start: 2024-03-26 | End: 2024-03-26

## 2024-03-26 RX ORDER — MAGNESIUM SULFATE HEPTAHYDRATE 40 MG/ML
2 INJECTION, SOLUTION INTRAVENOUS ONCE
Status: COMPLETED | OUTPATIENT
Start: 2024-03-26 | End: 2024-03-26

## 2024-03-26 RX ORDER — GADOBUTROL 604.72 MG/ML
8 INJECTION INTRAVENOUS
Status: COMPLETED | OUTPATIENT
Start: 2024-03-26 | End: 2024-03-26

## 2024-03-26 RX ORDER — PANTOPRAZOLE SODIUM 40 MG/10ML
40 INJECTION, POWDER, LYOPHILIZED, FOR SOLUTION INTRAVENOUS EVERY 12 HOURS SCHEDULED
Status: DISCONTINUED | OUTPATIENT
Start: 2024-03-26 | End: 2024-03-28

## 2024-03-26 RX ADMIN — SODIUM CHLORIDE 1000 ML: 0.9 INJECTION, SOLUTION INTRAVENOUS at 13:02

## 2024-03-26 RX ADMIN — GADOBUTROL 8 ML: 604.72 INJECTION INTRAVENOUS at 16:06

## 2024-03-26 RX ADMIN — MAGNESIUM SULFATE HEPTAHYDRATE 2 G: 40 INJECTION, SOLUTION INTRAVENOUS at 13:32

## 2024-03-26 RX ADMIN — SODIUM CHLORIDE 1000 MG: 0.9 INJECTION, SOLUTION INTRAVENOUS at 18:19

## 2024-03-26 RX ADMIN — PANTOPRAZOLE SODIUM 40 MG: 40 INJECTION, POWDER, FOR SOLUTION INTRAVENOUS at 20:56

## 2024-03-26 RX ADMIN — DIPHENHYDRAMINE HYDROCHLORIDE 25 MG: 50 INJECTION, SOLUTION INTRAMUSCULAR; INTRAVENOUS at 13:09

## 2024-03-26 RX ADMIN — METOCLOPRAMIDE 10 MG: 5 INJECTION, SOLUTION INTRAMUSCULAR; INTRAVENOUS at 13:23

## 2024-03-26 RX ADMIN — IOHEXOL 80 ML: 350 INJECTION, SOLUTION INTRAVENOUS at 16:45

## 2024-03-26 RX ADMIN — KETOROLAC TROMETHAMINE 30 MG: 30 INJECTION, SOLUTION INTRAMUSCULAR at 13:08

## 2024-03-26 NOTE — H&P
[facility]  H&P  Name: Mirta Lancaster 43 y.o. female I MRN: [id number]  Unit/Bed#: -01 I Date of Admission: 3/26/2024   Date of Service: 3/27/2024 I Hospital Day: 1      Assessment/Plan   * Optic neuritis  Assessment & Plan  POA with 3-day complaints of blurred vision on the right eye. Patient states that similar to migraine presentation the past but lasting much longer than typical  Denies nausea, vomiting, & headache.  No other neurodeficits noted.   CT of the head was negative,   MRI of the brain  & orbits w w/o contrast: Right sided optic neuritis with a lesion suspicious for an area of demyelination likely multiple sclerosis  Neurology consulted & recommendations as follows:    1 g IV Solu-Medrol daily for 3 to 5 days (depends on clinical course)  MRI of the cervical and thoracic spine with and without contrast  Consult IR for lumbar puncture (okay to start high-dose steroids prior to lumbar puncture)   Obtain CSF labs: Protein, cell count with differential, glucose, MS panel(oligoclonal bands in serum and CSF, IgG index), CSF ACE, [facility] lab sent out for CSF soluble IL-2 receptor, CSF Lyme serum CNS demyelinating disease evaluation profile, serum  SHOLA RF CCP ENP panel SSA SSB  Outpatient neuro neuro immunology f/u  Neurochecks every 4 hours    GERD (gastroesophageal reflux disease)  Assessment & Plan  Chronic.  Not on a current home regimen  Continue PPI BID    Ocular migraine  Assessment & Plan  History of migraines stopped BCP and headaches went away.  Migraines started after having a baby.     manageable at home at home with Tylenol and Motrin  Currently denies headache & c/o painless right mono ocular blurry vision  Plan as above  neurology consulted         VTE Pharmacologic Prophylaxis:   Low Risk (Score 0-2) - Encourage Ambulation.  Code Status: Level 1 - Full Code   Discussion with family: Patient declined call to .     Anticipated Length of Stay: Patient will  be admitted on an observation basis with an anticipated length of stay of less than 2 midnights secondary to ?MS.    Total Time Spent on Date of Encounter in care of patient: 45 mins. This time was spent on one or more of the following: performing physical exam; counseling and coordination of care; obtaining or reviewing history; documenting in the medical record; reviewing/ordering tests, medications or procedures; communicating with other healthcare professionals and discussing with patient's family/caregivers.    Chief Complaint: Mono ocular right blurry 3 days    History of Present Illness:  Mirta Lancaster is a 43 y.o. female with a PMH of migraines, GERD, who presents with right-sided blurry vision for 3 days.  She has symptoms in the past ocular migraines but typically last for few hours and spontaneous resolved.  Typically takes Tylenol and Motrin in the morning which relieves headache throughout the day. Symptoms started on Sunday and have no relief with current home regimen.  She denies pain in the right eye vision fields are intact, does describe a pressure sensation in right eye.  Denies nausea vomiting photo phobia and fever.     Review of Systems:  Review of Systems   Constitutional: Negative.    HENT: Negative.     Eyes:  Positive for visual disturbance.   Respiratory: Negative.     Cardiovascular: Negative.    Gastrointestinal: Negative.    Endocrine: Negative.    Genitourinary: Negative.    Musculoskeletal: Negative.    Skin: Negative.    Allergic/Immunologic: Negative.    Neurological:  Negative for dizziness, facial asymmetry, weakness, numbness and headaches.   Hematological: Negative.    Psychiatric/Behavioral: Negative.         Past Medical and Surgical History:   Past Medical History:   Diagnosis Date    GERD (gastroesophageal reflux disease)     Headache     Hypotension     Irregular heart beat     pt states she can feel her heart skip a beat or pause sometimes    Migraines        Past Surgical  History:   Procedure Laterality Date    APPENDECTOMY      BREAST BIOPSY Left 12 years ago    benign    BREAST CYST EXCISION Left     12:00 Benign Fibroadenoma    CERVICAL BIOPSY  W/ LOOP ELECTRODE EXCISION       SECTION      CHEST WALL BIOPSY Left 3/13/2020    Procedure: EXC  Bx of left breast with intra op U/S;  Surgeon: Josie Cooney MD;  Location: [facility];  Service: Surgical Oncology    DORSAL COMPARTMENT RELEASE Bilateral     EGD      KNEE ARTHROSCOPY W/ ACL RECONSTRUCTION      TONSILLECTOMY      US GUIDED BREAST BIOPSY LEFT COMPLETE Left 2020    US GUIDED BREAST BIOPSY RIGHT COMPLETE Right 10/30/2020    WISDOM TOOTH EXTRACTION         Meds/Allergies:  Prior to Admission medications    Medication Sig Start Date End Date Taking? Authorizing Provider   Multiple Vitamin (MULTIVITAMIN) tablet Take 1 tablet by mouth daily   Yes Historical Provider, MD   acetaminophen (TYLENOL) 325 mg tablet Take 650 mg by mouth every 6 (six) hours as needed for mild pain  Patient not taking: Reported on 2023    Historical Provider, MD   Calcium Carb-Cholecalciferol (CALCIUM 500+D PO) Take 1 tablet by mouth daily   Patient not taking: Reported on 2023    Historical Provider, MD   cetirizine (ZyrTEC) 10 mg tablet Take 10 mg by mouth daily  Patient not taking: Reported on 2023    Historical Provider, MD   fluticasone (FLONASE) 50 mcg/act nasal spray 1 spray into each nostril daily  Patient not taking: Reported on 2023    Historical Provider, MD   olopatadine HCl (PATADAY) 0.2 % opth drops Administer 1 drop to both eyes daily as needed (allergic conjunctivitis)  Patient not taking: Reported on 2023 3/9/23   Stephen Kimball DO   predniSONE 10 mg tablet Day 1: 6 tabs  Day 2: 5 tabs Day 3: 4 tabs  Day 4: 3 tabs  Day 5: 2 tabs  Day 6: 1 tab  Patient not taking: Reported on 2023   BAHMAN Moreland     I have reviewed home medications with patient personally.    Allergies:  "No Known Allergies    Social History:  Marital Status: /Civil Union   Occupation: employed  Patient Pre-hospital Living Situation: Home  Patient Pre-hospital Level of Mobility: walks  Patient Pre-hospital Diet Restrictions: nil  Substance Use History:   Social History     Substance and Sexual Activity   Alcohol Use Yes    Comment: soically     Social History     Tobacco Use   Smoking Status Never   Smokeless Tobacco Never     Social History     Substance and Sexual Activity   Drug Use No       Family History:  Family History   Problem Relation Age of Onset    Prostate cancer Maternal Grandfather 70    Lung cancer Maternal Grandfather 70    No Known Problems Mother     No Known Problems Father     No Known Problems Daughter     No Known Problems Maternal Grandmother     No Known Problems Paternal Grandmother     No Known Problems Paternal Grandfather     No Known Problems Brother     No Known Problems Daughter     No Known Problems Son     No Known Problems Son     No Known Problems Paternal Aunt     Breast cancer Paternal Aunt         60's    No Known Problems Paternal Aunt        Physical Exam:     Vitals:   Blood Pressure: 124/81 (03/26/24 2225)  Pulse: 76 (03/26/24 2225)  Temperature: 97.7 °F (36.5 °C) (03/26/24 2225)  Temp Source: Tympanic (03/26/24 2225)  Respirations: 18 (03/26/24 2225)  Height: 5' 7\" (170.2 cm) (03/26/24 1918)  Weight - Scale: 83 kg (183 lb) (03/26/24 1918)  SpO2: 96 % (03/26/24 2225)    Physical Exam  Vitals and nursing note reviewed.   Constitutional:       General: She is not in acute distress.     Appearance: She is well-developed.   HENT:      Head: Normocephalic and atraumatic.   Eyes:      General: Lids are normal. Lids are everted, no foreign bodies appreciated. Visual field deficit present.      Extraocular Movements:      Right eye: Normal extraocular motion.      Left eye: Normal extraocular motion.      Conjunctiva/sclera: Conjunctivae normal.      Comments: Blurriness " noted in right eye   Cardiovascular:      Rate and Rhythm: Normal rate and regular rhythm.      Heart sounds: No murmur heard.  Pulmonary:      Effort: Pulmonary effort is normal. No respiratory distress.      Breath sounds: Normal breath sounds.   Abdominal:      Palpations: Abdomen is soft.      Tenderness: There is no abdominal tenderness.   Musculoskeletal:         General: No swelling.      Cervical back: Neck supple.   Skin:     General: Skin is warm and dry.      Capillary Refill: Capillary refill takes less than 2 seconds.   Neurological:      Mental Status: She is alert.   Psychiatric:         Mood and Affect: Mood normal.        HEENT-PERRLA, moist oral mucosa  Neck-supple, no JVD elevation   Respiratory-equal air entry bilaterally, no rales or rhonchi  Cardiovascular system-S1, S2 heard, no murmur or gallops or rubs  Abdomen-soft, nontender, no guarding or rigidity, bowel sounds heard  Extremities-no pedal edema  Peripheral pulses palpable  Musculoskeletal-no contractures  Central nervous system-no acute focal neurological deficit ,no sensory or motor deficit noted.  Skin-no rash noted       Additional Data:     Lab Results:  Results from last 7 days   Lab Units 03/26/24  1305   WBC Thousand/uL 7.48   HEMOGLOBIN g/dL 13.2   HEMATOCRIT % 39.5   PLATELETS Thousands/uL 254   NEUTROS PCT % 65   LYMPHS PCT % 26   MONOS PCT % 7   EOS PCT % 1     Results from last 7 days   Lab Units 03/26/24  1305   SODIUM mmol/L 138   POTASSIUM mmol/L 3.5   CHLORIDE mmol/L 103   CO2 mmol/L 26   BUN mg/dL 17   CREATININE mg/dL 0.94   ANION GAP mmol/L 9   CALCIUM mg/dL 9.3   ALBUMIN g/dL 4.8   TOTAL BILIRUBIN mg/dL 0.43   ALK PHOS U/L 53   ALT U/L 11   AST U/L 14   GLUCOSE RANDOM mg/dL 85                       Lines/Drains:  Invasive Devices       Peripheral Intravenous Line  Duration             Peripheral IV 03/26/24 Right Antecubital <1 day                        Imaging: Personally reviewed the following imaging: CT head and  MRI brain  MRI follow up neuro   Final Result by Wayne Arreola MD (03/26 1986)      Sagittal double inversion recovery and FLAIR imaging demonstrates increased conspicuity of a focus of T2 prolongation/FLAIR signal hyperintensity within the left middle cerebellar peduncle.      Although sagittal double inversion recovery sequences does not convincingly demonstrate additional areas of periventricular FLAIR signal hyperintensity there is persistent suspicion of mild FLAIR signal hyperintensity adjacent to the occipital horns with    punctate enhancement again noted on the left as correlated with prior examination.      Findings overall remain suspicious for CNS demyelinating disease.         Workstation performed: [id number]         CTA head and neck with and without contrast   Final Result by Shon Leon MD (03/26 1800)      CTA head:   -No large vessel occlusion, high-grade stenosis or aneurysm.      CTA neck:   -Streak artifact from contrast bolus limits evaluation of origin of the right common carotid and right subclavian arteries   -Otherwise no high-grade stenosis, dissection or aneurysm.                     Workstation performed: [id number]         MRI brain and orbits wo and w contrast   Final Result by Wayne Arreola MD (03/26 8761)      Right-sided optic neuritis, as described above.      Subtle periventricular FLAIR signal hyperintensity as well as punctate enhancing focus adjacent to the left occipital horn. Findings raise a suspicion for underlying demyelinating disease/multiple sclerosis with active enhancement.      Follow-up examination should be performed with dedicated sagittal FLAIR or double inversion recovery imaging.         I personally discussed this study with the ER on 3/26/2024 4:55 PM.                  Workstation performed: [id number]         CT head without contrast   Final Result by Lawson Lema MD (03/26 9847)      No acute intracranial abnormality.                  Workstation performed:  [id number]         MRI thoracic spine w wo contrast    (Results Pending)   MRI cervical spine w wo contrast    (Results Pending)       EKG and Other Studies Reviewed on Admission:   EKG: NSR. HR 70.    ** Please Note: This note has been constructed using a voice recognition system. **

## 2024-03-26 NOTE — TELEMEDICINE
e-Consult (IPC)   Mirta Lancaster 43 y.o. female MRN: [id number]  Unit/Bed#: ED OVR 21 Encounter: [id number]      Reason for Consult / Principal Problem: R eye vision impairment  Consults  03/26/24      ASSESSMENT:  43-year-old female with past medical history pertinent for migraine with visual aura presents with 3 days of painless right eye monocular blurry vision.  Reportedly similar to her migrainous visual aura, but lasting much longer than typical.  ED exam reportedly notable for visual acuity 20/25 bilaterally, no afferent pupillary defect, visual fields grossly intact, no red desaturation.  Basic labs unremarkable.  Ocular POCUS without evidence of retinal detachment and optic nerve measuring 0.4 cm.  MRI brain and orbits with and without contrast revealed prechiasmatic and intracanalicular edema and enhancement consistent with optic neuritis as well as right occipital horn periventricular FLAIR hyperintensity and punctate enhancing lesion adjacent to the left occipital horn.  Presentation overall concerning for a CNS demyelinating process.  The prechiasmatic involvement does however raise suspicion for mimics including NMO, MOGAD, neurosarcoidosis, and CNS manifestation of systemic rheumatologic disease will need further workup with MRI cervical and thoracic spine with and without contrast as well as lumbar puncture and autoimmune serology.  As symptoms have been present for 3 days now, would favor initiation of high-dose steroids even prior to lumbar puncture.    RECOMMENDATIONS:  -Start IV methylprednisolone 1 g daily x 5 days (pending clinical course and results of further workup can consider transitioning the last few doses to either outpatient infusion center or oral prednisone equivalent)  -Start PPI for GI prophylaxis during steroid course  -After IV steroid course, start prednisone 60 mg daily with taper of 10 mg/week  -MRI cervical and thoracic spine with and without contrast  -IR lumbar  puncture   -Protein, cell count with differential, glucose  -MS panel (oligoclonal bands in serum and CSF, IgG index)  -CSF ACE  -[facility] sendout for CSF soluble IL-2 receptor (Interleukin 2 Receptor, Soluble, CSF  [facility] Test Directory )  -CSF Lyme  -Serum CNS demyelinating disease evaluation profile  -Serum Lyme, SHOLA, RF, CCP, PANTERA panel, SSA/SSB  -Outpatient neuro immunology follow-up    31 + minutes, >50% of the total time devoted to medical consultative verbal/EMR discussion between providers. Written report will be generated in the EMR.    Luciano England MD  Neurology  03/26/24    This note was completed in part utilizing Dragon Dictation Software. Grammatical errors, random word insertions, spelling mistakes, and incomplete sentences may be an occasional consequence of this system secondary to software limitations, ambient noise, and hardware issues.  If you have any questions or concerns about the content, text, or information contained within the body of this dictation, please contact the provider for clarification.

## 2024-03-26 NOTE — ED PROVIDER NOTES
History  Chief Complaint   Patient presents with    Eye Problem     Pt states she cannot see out of her right eye since .  Reports hx ocular migraine in that eye     43-year-old female with history of migraines, GERD who presents for evaluation of right-sided vision loss.  Patient reports that for the past 2 days, she has had significantly blurred vision in her right eye.  She feels as though she has lost some of her peripheral vision as well.  She has had the symptoms in the past with ocular migraines but typically they last for less than a few hours and then spontaneously resolved.  The symptoms have been ongoing since  which is unusual.  She has a chronic daily headache which is unchanged from baseline.  She typically takes Tylenol and Motrin in the morning which relieves her headache throughout the day.  She did take these medications this morning and she currently does not have a headache.  She denies any pain in the right eye.  She does have somewhat of a pressure sensation when looking medially in the right eye.  She otherwise denies nausea, vomiting, photophobia, fever.        Prior to Admission Medications   Prescriptions Last Dose Informant Patient Reported? Taking?   Calcium Carb-Cholecalciferol (CALCIUM 500+D PO)  Self Yes No   Sig: Take 1 tablet by mouth daily    Patient not taking: Reported on 2023   Multiple Vitamin (MULTIVITAMIN) tablet Past Week Self Yes Yes   Sig: Take 1 tablet by mouth daily   acetaminophen (TYLENOL) 325 mg tablet  Self Yes No   Sig: Take 650 mg by mouth every 6 (six) hours as needed for mild pain   Patient not taking: Reported on 2023   cetirizine (ZyrTEC) 10 mg tablet  Self Yes No   Sig: Take 10 mg by mouth daily   Patient not taking: Reported on 2023   fluticasone (FLONASE) 50 mcg/act nasal spray  Self Yes No   Si spray into each nostril daily   Patient not taking: Reported on 2023   olopatadine HCl (PATADAY) 0.2 % opth drops  Self No No    Sig: Administer 1 drop to both eyes daily as needed (allergic conjunctivitis)   Patient not taking: Reported on 2023   predniSONE 10 mg tablet  Self No No   Sig: Day 1: 6 tabs  Day 2: 5 tabs Day 3: 4 tabs  Day 4: 3 tabs  Day 5: 2 tabs  Day 6: 1 tab   Patient not taking: Reported on 2023      Facility-Administered Medications: None       Past Medical History:   Diagnosis Date    GERD (gastroesophageal reflux disease)     Headache     Hypotension     Irregular heart beat     pt states she can feel her heart skip a beat or pause sometimes    Migraines        Past Surgical History:   Procedure Laterality Date    APPENDECTOMY      BREAST BIOPSY Left 12 years ago    benign    BREAST CYST EXCISION Left     12:00 Benign Fibroadenoma    CERVICAL BIOPSY  W/ LOOP ELECTRODE EXCISION       SECTION      CHEST WALL BIOPSY Left 3/13/2020    Procedure: EXC  Bx of left breast with intra op U/S;  Surgeon: Josie Cooney MD;  Location: [facility];  Service: Surgical Oncology    DORSAL COMPARTMENT RELEASE Bilateral     EGD      KNEE ARTHROSCOPY W/ ACL RECONSTRUCTION      TONSILLECTOMY      US GUIDED BREAST BIOPSY LEFT COMPLETE Left 2020    US GUIDED BREAST BIOPSY RIGHT COMPLETE Right 10/30/2020    WISDOM TOOTH EXTRACTION         Family History   Problem Relation Age of Onset    Prostate cancer Maternal Grandfather 70    Lung cancer Maternal Grandfather 70    No Known Problems Mother     No Known Problems Father     No Known Problems Daughter     No Known Problems Maternal Grandmother     No Known Problems Paternal Grandmother     No Known Problems Paternal Grandfather     No Known Problems Brother     No Known Problems Daughter     No Known Problems Son     No Known Problems Son     No Known Problems Paternal Aunt     Breast cancer Paternal Aunt         60's    No Known Problems Paternal Aunt      I have reviewed and agree with the history as documented.    E-Cigarette/Vaping    E-Cigarette Use Never User       E-Cigarette/Vaping Substances    Nicotine No     THC No     CBD No     Flavoring No     Other No     Unknown No      Social History     Tobacco Use    Smoking status: Never    Smokeless tobacco: Never   Vaping Use    Vaping status: Never Used   Substance Use Topics    Alcohol use: Yes     Comment: soically    Drug use: No       Review of Systems   Constitutional:  Negative for chills and fever.   Eyes:  Positive for visual disturbance. Negative for photophobia, pain, discharge and redness.   Respiratory:  Negative for shortness of breath.    Cardiovascular:  Negative for chest pain.   Gastrointestinal:  Negative for abdominal pain, diarrhea, nausea and vomiting.   Genitourinary:  Negative for flank pain.   Musculoskeletal:  Negative for gait problem.   Skin:  Negative for rash.   Neurological:  Positive for headaches (chronic, not current). Negative for dizziness, speech difficulty, weakness, light-headedness and numbness.   All other systems reviewed and are negative.      Physical Exam  Physical Exam  Vitals and nursing note reviewed.   Constitutional:       General: She is not in acute distress.     Appearance: She is well-developed. She is not ill-appearing.   HENT:      Head: Normocephalic and atraumatic.      Nose: Nose normal.      Mouth/Throat:      Mouth: Mucous membranes are moist.   Eyes:      Extraocular Movements: Extraocular movements intact.      Conjunctiva/sclera: Conjunctivae normal.      Pupils: Pupils are equal, round, and reactive to light.   Cardiovascular:      Rate and Rhythm: Normal rate and regular rhythm.      Heart sounds: No murmur heard.     No friction rub. No gallop.   Pulmonary:      Effort: Pulmonary effort is normal.      Breath sounds: Normal breath sounds. No wheezing, rhonchi or rales.   Abdominal:      General: There is no distension.      Palpations: Abdomen is soft.      Tenderness: There is no abdominal tenderness.   Musculoskeletal:         General: No swelling or  "tenderness. Normal range of motion.      Cervical back: Normal range of motion and neck supple.   Skin:     General: Skin is warm and dry.      Coloration: Skin is not pale.      Findings: No rash.   Neurological:      General: No focal deficit present.      Mental Status: She is alert and oriented to person, place, and time.      Cranial Nerves: No cranial nerve deficit.      Sensory: No sensory deficit.      Motor: No weakness.      Coordination: Coordination normal.      Gait: Gait normal.      Comments: Cranial nerves II through XII are intact.  5 out of 5 strength and sensation intact all 4 extremities.  Visual fields are intact.  No ataxia.  Gait normal.   Psychiatric:         Behavior: Behavior normal.         Vital Signs  ED Triage Vitals [03/26/24 1249]   Temperature Pulse Respirations Blood Pressure SpO2   98.1 °F (36.7 °C) 70 12 126/79 99 %      Temp Source Heart Rate Source Patient Position - Orthostatic VS BP Location FiO2 (%)   Oral Monitor Sitting Right arm --      Pain Score       No Pain           Vitals:    03/26/24 1249 03/26/24 1435   BP: 126/79 107/60   Pulse: 70 60   Patient Position - Orthostatic VS: Sitting Lying         Visual Acuity  Visual Acuity      Flowsheet Row Most Recent Value   Visual acuity R eye is 20/25  [pt states she can see and read chart but it is \"all fuzzy\"]   Visual acuity Left eye is 20/25            ED Medications  Medications   methylPREDNISolone sodium succinate (Solu-MEDROL) 1,000 mg in sodium chloride 0.9 % 250 mL IVPB (1,000 mg Intravenous New Bag 3/26/24 1819)   ketorolac (TORADOL) injection 30 mg (30 mg Intravenous Given 3/26/24 1308)   metoclopramide (REGLAN) injection 10 mg (10 mg Intravenous Given 3/26/24 1323)   diphenhydrAMINE (BENADRYL) injection 25 mg (25 mg Intravenous Given 3/26/24 1309)   magnesium sulfate 2 g/50 mL IVPB (premix) 2 g (0 g Intravenous Stopped 3/26/24 1434)   sodium chloride 0.9 % bolus 1,000 mL (0 mL Intravenous Stopped 3/26/24 1413) "   Gadobutrol injection (SINGLE-DOSE) SOLN 8 mL (8 mL Intravenous Given 3/26/24 1606)   iohexol (OMNIPAQUE) 350 MG/ML injection (SINGLE-DOSE) 100 mL (80 mL Intravenous Given 3/26/24 1645)       Diagnostic Studies  Results Reviewed       Procedure Component Value Units Date/Time    hCG, qualitative pregnancy [[id number]]  (Normal) Collected: 03/26/24 1305    Lab Status: Final result Specimen: Blood from Arm, Right Updated: 03/26/24 1334     Preg, Serum Negative    Comprehensive metabolic panel [[id number]] Collected: 03/26/24 1305    Lab Status: Final result Specimen: Blood from Arm, Right Updated: 03/26/24 1325     Sodium 138 mmol/L      Potassium 3.5 mmol/L      Chloride 103 mmol/L      CO2 26 mmol/L      ANION GAP 9 mmol/L      BUN 17 mg/dL      Creatinine 0.94 mg/dL      Glucose 85 mg/dL      Calcium 9.3 mg/dL      AST 14 U/L      ALT 11 U/L      Alkaline Phosphatase 53 U/L      Total Protein 7.5 g/dL      Albumin 4.8 g/dL      Total Bilirubin 0.43 mg/dL      eGFR 74 ml/min/1.73sq m     Narrative:      National Kid[employer] Disease Foundation guidelines for Chronic Kidney Disease (CKD):     Stage 1 with normal or high GFR (GFR > 90 mL/min/1.73 square meters)    Stage 2 Mild CKD (GFR = 60-89 mL/min/1.73 square meters)    Stage 3A Moderate CKD (GFR = 45-59 mL/min/1.73 square meters)    Stage 3B Moderate CKD (GFR = 30-44 mL/min/1.73 square meters)    Stage 4 Severe CKD (GFR = 15-29 mL/min/1.73 square meters)    Stage 5 End Stage CKD (GFR <15 mL/min/1.73 square meters)  Note: GFR calculation is accurate only with a steady state creatinine    CBC and differential [[id number]] Collected: 03/26/24 1305    Lab Status: Final result Specimen: Blood from Arm, Right Updated: 03/26/24 1310     WBC 7.48 Thousand/uL      RBC 4.26 Million/uL      Hemoglobin 13.2 g/dL      Hematocrit 39.5 %      MCV 93 fL      MCH 31.0 pg      MCHC 33.4 g/dL      RDW 13.5 %      MPV 10.2 fL      Platelets 254 Thousands/uL      nRBC 0 /100 WBCs       Neutrophils Relative 65 %      Immature Grans % 0 %      Lymphocytes Relative 26 %      Monocytes Relative 7 %      Eosinophils Relative 1 %      Basophils Relative 1 %      Neutrophils Absolute 4.83 Thousands/µL      Absolute Immature Grans 0.02 Thousand/uL      Absolute Lymphocytes 1.97 Thousands/µL      Absolute Monocytes 0.52 Thousand/µL      Eosinophils Absolute 0.10 Thousand/µL      Basophils Absolute 0.04 Thousands/µL                    MRI follow up neuro   Final Result by Wayne Arreola MD (03/26 2445)      Sagittal double inversion recovery and FLAIR imaging demonstrates increased conspicuity of a focus of T2 prolongation/FLAIR signal hyperintensity within the left middle cerebellar peduncle.      Although sagittal double inversion recovery sequences does not convincingly demonstrate additional areas of periventricular FLAIR signal hyperintensity there is persistent suspicion of mild FLAIR signal hyperintensity adjacent to the occipital horns with    punctate enhancement again noted on the left as correlated with prior examination.      Findings overall remain suspicious for CNS demyelinating disease.         Workstation performed: [id number]         CTA head and neck with and without contrast   Final Result by Shon Leon MD (03/26 1800)      CTA head:   -No large vessel occlusion, high-grade stenosis or aneurysm.      CTA neck:   -Streak artifact from contrast bolus limits evaluation of origin of the right common carotid and right subclavian arteries   -Otherwise no high-grade stenosis, dissection or aneurysm.                     Workstation performed: [id number]         MRI brain and orbits wo and w contrast   Final Result by Wayne Arreola MD (03/26 1591)      Right-sided optic neuritis, as described above.      Subtle periventricular FLAIR signal hyperintensity as well as punctate enhancing focus adjacent to the left occipital horn. Findings raise a suspicion for underlying demyelinating disease/multiple  sclerosis with active enhancement.      Follow-up examination should be performed with dedicated sagittal FLAIR or double inversion recovery imaging.         I personally discussed this study with the ER on 3/26/2024 4:55 PM.                  Workstation performed: [id number]         CT head without contrast   Final Result by Lawson Lema MD (03/26 1423)      No acute intracranial abnormality.                  Workstation performed: [id number]         MRI thoracic spine w wo contrast    (Results Pending)   MRI cervical spine w wo contrast    (Results Pending)              Procedures  Procedures         ED Course  ED Course as of 03/26/24 1852   Tue Mar 26, 2024   1325 Comprehensive metabolic panel   1401 hCG, qualitative pregnancy   1401 CBC and differential   1429 Patient re-evaluated. No change in ocular symptoms.   1455 Discussed with Dr. England from neurology. Recommends ophthalmology consultation, MRI brain and orbits with and without contrast and CTA head/neck.   1518 Discussed with Dr. Lubin from ophthalmology. No need for emergent ophthalmology evaluation from his standpoint. He can see her in the office tomorrow barring there is no other need for admission. If admission is needed, transfer to [place] would be reasonable as he does not come to [place].   1724 Discussed MRI results with Dr. England. Recommends to admit her, start steroids (iv methylpred 1g daily x3-5 days), get IR LP tomorrow (I'll put in a note with what labs to order) and an MRI of c and t spine with and without contrast.                               SBIRT 22yo+      Flowsheet Row Most Recent Value   Initial Alcohol Screen: US AUDIT-C     1. How often do you have a drink containing alcohol? 0 Filed at: 03/26/2024 1240   2. How many drinks containing alcohol do you have on a typical day you are drinking?  0 Filed at: 03/26/2024 1240   3b. FEMALE Any Age, or MALE 65+: How often do you have 4 or more drinks on one occassion? 0 Filed at:  03/26/2024 1240   Audit-C Score 0 Filed at: 03/26/2024 1240   SCARLET: How many times in the past year have you...    Used an illegal drug or used a prescription medication for non-medical reasons? Never Filed at: 03/26/2024 1240                      Medical Decision Making  43-year-old female presenting for evaluation of blurred vision in the right eye.  No focal neurologic deficits noted on exam.  Vital signs stable.  Differential diagnoses include but not limited to ocular migraine, intracranial or ocular mass, optic neuritis, MS, idiopathic intracranial hypertension, CVA, retinal detachment.  Labs overall unremarkable.  Initial Noncon CT without any acute pathology noted.  Discussed with neurology, recommending MRI brain and orbits.  Bedside ultrasound without any evidence of retinal detachment, normal optic nerve diameter.  MRI showing evidence of optic neuritis as well as a lesion suspicious for demyelinating disorder.  Discussed with neurology, recommends admission with further imaging, LP, and high-dose steroids.  Discussed with [facility] and admitted to their service for further evaluation and management.    Problems Addressed:  Abnormal finding on MRI of brain: acute illness or injury  Blurred vision, right eye: acute illness or injury  Optic neuritis: acute illness or injury    Amount and/or Complexity of Data Reviewed  Labs: ordered. Decision-making details documented in ED Course.  Radiology: ordered.    Risk  Prescription drug management.  Decision regarding hospitalization.             Disposition  Final diagnoses:   Abnormal finding on MRI of brain   Optic neuritis   Blurred vision, right eye     Time reflects when diagnosis was documented in both MDM as applicable and the Disposition within this note       Time User Action Codes Description Comment    3/26/2024  5:35 PM Mirela Rahman Add [R90.89] Abnormal finding on MRI of brain     3/26/2024  5:47 PM Mirela Rahman [H46.9] Optic neuritis     3/26/2024   5:47 PM Mirela Rahman [R90.89] Abnormal finding on MRI of brain     3/26/2024  5:47 PM Mirela Rahman Modify [H46.9] Optic neuritis     3/26/2024  5:47 PM Mirela Rahman Add [H53.8] Blurred vision, right eye           ED Disposition       ED Disposition   Admit    Condition   Stable    Date/Time   Tue Mar 26, 2024 6702    Comment   Case was discussed with ZANA and the patient's admission status was agreed to be Admission Status: inpatient status to the service of Dr. King .               Follow-up Information    None         Current Discharge Medication List        CONTINUE these medications which have NOT CHANGED    Details   Multiple Vitamin (MULTIVITAMIN) tablet Take 1 tablet by mouth daily      acetaminophen (TYLENOL) 325 mg tablet Take 650 mg by mouth every 6 (six) hours as needed for mild pain      Calcium Carb-Cholecalciferol (CALCIUM 500+D PO) Take 1 tablet by mouth daily       cetirizine (ZyrTEC) 10 mg tablet Take 10 mg by mouth daily      fluticasone (FLONASE) 50 mcg/act nasal spray 1 spray into each nostril daily      olopatadine HCl (PATADAY) 0.2 % opth drops Administer 1 drop to both eyes daily as needed (allergic conjunctivitis)  Qty: 2.5 mL, Refills: 3    Associated Diagnoses: Allergic conjunctivitis of both eyes      predniSONE 10 mg tablet Day 1: 6 tabs  Day 2: 5 tabs Day 3: 4 tabs  Day 4: 3 tabs  Day 5: 2 tabs  Day 6: 1 tab  Qty: 21 tablet, Refills: 0    Associated Diagnoses: Acute bilateral low back pain without sciatica             No discharge procedures on file.    PDMP Review       None            ED Provider  Electronically Signed by             Mirela Rahman MD  03/26/24 9231

## 2024-03-26 NOTE — ASSESSMENT & PLAN NOTE
POA with 3-day complaints of blurred vision on the right eye. Patient states that similar to migraine presentation the past but lasting much longer than typical  Denies nausea, vomiting, & headache.  No acute neurodeficits noted.   CT of the head was negative,   MRI of the brain  & orbits w w/o contrast: Right sided optic neuritis with a lesion suspicious for an area of demyelination likely multiple sclerosis  Neurology consulted & recommendations as follows:    1 g IV Solu-Medrol daily for 3 to 5 days (depends on clinical course)  MRI of the cervical and thoracic spine with and without contrast  Consult IR for lumbar puncture (okay to start high-dose steroids prior to lumbar puncture)   Obtain CSF labs: Protein, cell count with differential, glucose, MS panel(oligoclonal bands in serum and CSF, IgG index), CSF ACE, [facility] lab sent out for CSF soluble IL-2 receptor, CSF Lyme serum CNS demyelinating disease evaluation profile, serum  SHOLA RF CCP ENP panel SSA SSB  Outpatient neuro neuro immunology f/u  Neurochecks every 4 hours

## 2024-03-27 ENCOUNTER — APPOINTMENT (INPATIENT)
Dept: INTERVENTIONAL RADIOLOGY/VASCULAR | Facility: HOSPITAL | Age: 44
DRG: 059 | End: 2024-03-27
Payer: COMMERCIAL

## 2024-03-27 ENCOUNTER — TELEPHONE (OUTPATIENT)
Dept: MRI IMAGING | Facility: HOSPITAL | Age: 44
End: 2024-03-27

## 2024-03-27 LAB
ANA SER QL IA: NEGATIVE
ANION GAP SERPL CALCULATED.3IONS-SCNC: 10 MMOL/L (ref 4–13)
APPEARANCE CSF: CLEAR
B BURGDOR IGG+IGM SER QL IA: NEGATIVE
BUN SERPL-MCNC: 18 MG/DL (ref 5–25)
CALCIUM SERPL-MCNC: 9 MG/DL (ref 8.4–10.2)
CHLORIDE SERPL-SCNC: 107 MMOL/L (ref 96–108)
CO2 SERPL-SCNC: 19 MMOL/L (ref 21–32)
CREAT SERPL-MCNC: 0.81 MG/DL (ref 0.6–1.3)
ERYTHROCYTE [DISTWIDTH] IN BLOOD BY AUTOMATED COUNT: 12.9 % (ref 11.6–15.1)
GFR SERPL CREATININE-BSD FRML MDRD: 89 ML/MIN/1.73SQ M
GLUCOSE CSF-MCNC: 92 MG/DL (ref 40–70)
GLUCOSE SERPL-MCNC: 183 MG/DL (ref 65–140)
HCT VFR BLD AUTO: 36.1 % (ref 34.8–46.1)
HGB BLD-MCNC: 12 G/DL (ref 11.5–15.4)
INR PPP: 1.33 (ref 0.84–1.19)
MCH RBC QN AUTO: 30.5 PG (ref 26.8–34.3)
MCHC RBC AUTO-ENTMCNC: 33.2 G/DL (ref 31.4–37.4)
MCV RBC AUTO: 92 FL (ref 82–98)
PLATELET # BLD AUTO: 251 THOUSANDS/UL (ref 149–390)
PMV BLD AUTO: 10.6 FL (ref 8.9–12.7)
POTASSIUM SERPL-SCNC: 4 MMOL/L (ref 3.5–5.3)
PROT CSF-MCNC: 33 MG/DL (ref 15–45)
PROTHROMBIN TIME: 16.3 SECONDS (ref 11.6–14.5)
RBC # BLD AUTO: 3.93 MILLION/UL (ref 3.81–5.12)
RBC # CSF MANUAL: 2 UL (ref 0–10)
RHEUMATOID FACT SER QL LA: NEGATIVE
SODIUM SERPL-SCNC: 136 MMOL/L (ref 135–147)
TOTAL CELLS COUNTED BLD: NO
TUBE # CSF: 4
WBC # BLD AUTO: 8.52 THOUSAND/UL (ref 4.31–10.16)
WBC # CSF AUTO: 9 /UL (ref 0–5)

## 2024-03-27 PROCEDURE — G0427 INPT/ED TELECONSULT70: HCPCS | Performed by: STUDENT IN AN ORGANIZED HEALTH CARE EDUCATION/TRAINING PROGRAM

## 2024-03-27 PROCEDURE — 84157 ASSAY OF PROTEIN OTHER: CPT | Performed by: STUDENT IN AN ORGANIZED HEALTH CARE EDUCATION/TRAINING PROGRAM

## 2024-03-27 PROCEDURE — 82042 OTHER SOURCE ALBUMIN QUAN EA: CPT | Performed by: STUDENT IN AN ORGANIZED HEALTH CARE EDUCATION/TRAINING PROGRAM

## 2024-03-27 PROCEDURE — 85027 COMPLETE CBC AUTOMATED: CPT | Performed by: INTERNAL MEDICINE

## 2024-03-27 PROCEDURE — 87070 CULTURE OTHR SPECIMN AEROBIC: CPT | Performed by: INTERNAL MEDICINE

## 2024-03-27 PROCEDURE — 83916 OLIGOCLONAL BANDS: CPT | Performed by: STUDENT IN AN ORGANIZED HEALTH CARE EDUCATION/TRAINING PROGRAM

## 2024-03-27 PROCEDURE — 62270 DX LMBR SPI PNXR: CPT

## 2024-03-27 PROCEDURE — 87205 SMEAR GRAM STAIN: CPT | Performed by: INTERNAL MEDICINE

## 2024-03-27 PROCEDURE — 82164 ANGIOTENSIN I ENZYME TEST: CPT | Performed by: STUDENT IN AN ORGANIZED HEALTH CARE EDUCATION/TRAINING PROGRAM

## 2024-03-27 PROCEDURE — 85610 PROTHROMBIN TIME: CPT

## 2024-03-27 PROCEDURE — 87476 LYME DIS DNA AMP PROBE: CPT | Performed by: INTERNAL MEDICINE

## 2024-03-27 PROCEDURE — 77003 FLUOROGUIDE FOR SPINE INJECT: CPT

## 2024-03-27 PROCEDURE — 82784 ASSAY IGA/IGD/IGG/IGM EACH: CPT | Performed by: STUDENT IN AN ORGANIZED HEALTH CARE EDUCATION/TRAINING PROGRAM

## 2024-03-27 PROCEDURE — 86362 MOG-IGG1 ANTB CBA EACH: CPT | Performed by: INTERNAL MEDICINE

## 2024-03-27 PROCEDURE — 86200 CCP ANTIBODY: CPT | Performed by: INTERNAL MEDICINE

## 2024-03-27 PROCEDURE — 82040 ASSAY OF SERUM ALBUMIN: CPT | Performed by: STUDENT IN AN ORGANIZED HEALTH CARE EDUCATION/TRAINING PROGRAM

## 2024-03-27 PROCEDURE — 89051 BODY FLUID CELL COUNT: CPT | Performed by: STUDENT IN AN ORGANIZED HEALTH CARE EDUCATION/TRAINING PROGRAM

## 2024-03-27 PROCEDURE — 86052 AQUAPORIN-4 ANTB CBA EACH: CPT | Performed by: INTERNAL MEDICINE

## 2024-03-27 PROCEDURE — 86430 RHEUMATOID FACTOR TEST QUAL: CPT | Performed by: INTERNAL MEDICINE

## 2024-03-27 PROCEDURE — 99232 SBSQ HOSP IP/OBS MODERATE 35: CPT | Performed by: INTERNAL MEDICINE

## 2024-03-27 PROCEDURE — 87483 CNS DNA AMP PROBE TYPE 12-25: CPT | Performed by: INTERNAL MEDICINE

## 2024-03-27 PROCEDURE — 80048 BASIC METABOLIC PNL TOTAL CA: CPT | Performed by: INTERNAL MEDICINE

## 2024-03-27 PROCEDURE — C9113 INJ PANTOPRAZOLE SODIUM, VIA: HCPCS | Performed by: INTERNAL MEDICINE

## 2024-03-27 PROCEDURE — 62328 DX LMBR SPI PNXR W/FLUOR/CT: CPT | Performed by: RADIOLOGY

## 2024-03-27 PROCEDURE — 009U3ZX DRAINAGE OF SPINAL CANAL, PERCUTANEOUS APPROACH, DIAGNOSTIC: ICD-10-PCS | Performed by: RADIOLOGY

## 2024-03-27 PROCEDURE — 86039 ANTINUCLEAR ANTIBODIES (ANA): CPT | Performed by: INTERNAL MEDICINE

## 2024-03-27 PROCEDURE — 86038 ANTINUCLEAR ANTIBODIES: CPT | Performed by: INTERNAL MEDICINE

## 2024-03-27 PROCEDURE — 89050 BODY FLUID CELL COUNT: CPT | Performed by: STUDENT IN AN ORGANIZED HEALTH CARE EDUCATION/TRAINING PROGRAM

## 2024-03-27 PROCEDURE — 83873 ASSAY OF CSF PROTEIN: CPT | Performed by: STUDENT IN AN ORGANIZED HEALTH CARE EDUCATION/TRAINING PROGRAM

## 2024-03-27 PROCEDURE — 82945 GLUCOSE OTHER FLUID: CPT | Performed by: STUDENT IN AN ORGANIZED HEALTH CARE EDUCATION/TRAINING PROGRAM

## 2024-03-27 PROCEDURE — 86618 LYME DISEASE ANTIBODY: CPT | Performed by: INTERNAL MEDICINE

## 2024-03-27 PROCEDURE — 86235 NUCLEAR ANTIGEN ANTIBODY: CPT | Performed by: INTERNAL MEDICINE

## 2024-03-27 PROCEDURE — 87077 CULTURE AEROBIC IDENTIFY: CPT | Performed by: INTERNAL MEDICINE

## 2024-03-27 PROCEDURE — 83520 IMMUNOASSAY QUANT NOS NONAB: CPT | Performed by: STUDENT IN AN ORGANIZED HEALTH CARE EDUCATION/TRAINING PROGRAM

## 2024-03-27 RX ORDER — BUTALBITAL, ACETAMINOPHEN AND CAFFEINE 50; 325; 40 MG/1; MG/1; MG/1
1 TABLET ORAL EVERY 4 HOURS PRN
Status: DISCONTINUED | OUTPATIENT
Start: 2024-03-27 | End: 2024-03-29 | Stop reason: HOSPADM

## 2024-03-27 RX ORDER — ACETAMINOPHEN 325 MG/1
650 TABLET ORAL EVERY 6 HOURS PRN
Status: DISCONTINUED | OUTPATIENT
Start: 2024-03-27 | End: 2024-03-29 | Stop reason: HOSPADM

## 2024-03-27 RX ORDER — LORAZEPAM 2 MG/ML
0.5 INJECTION INTRAMUSCULAR ONCE
Status: COMPLETED | OUTPATIENT
Start: 2024-03-27 | End: 2024-03-27

## 2024-03-27 RX ORDER — SODIUM CHLORIDE, SODIUM LACTATE, POTASSIUM CHLORIDE, CALCIUM CHLORIDE 600; 310; 30; 20 MG/100ML; MG/100ML; MG/100ML; MG/100ML
125 INJECTION, SOLUTION INTRAVENOUS CONTINUOUS
Status: DISCONTINUED | OUTPATIENT
Start: 2024-03-27 | End: 2024-03-28

## 2024-03-27 RX ORDER — LIDOCAINE HYDROCHLORIDE 10 MG/ML
INJECTION, SOLUTION EPIDURAL; INFILTRATION; INTRACAUDAL; PERINEURAL AS NEEDED
Status: COMPLETED | OUTPATIENT
Start: 2024-03-27 | End: 2024-03-27

## 2024-03-27 RX ADMIN — LORAZEPAM 0.5 MG: 2 INJECTION INTRAMUSCULAR; INTRAVENOUS at 15:39

## 2024-03-27 RX ADMIN — ACETAMINOPHEN 650 MG: 325 TABLET, FILM COATED ORAL at 20:57

## 2024-03-27 RX ADMIN — BUTALBITAL, ACETAMINOPHEN, AND CAFFEINE 1 TABLET: 50; 325; 40 TABLET, COATED ORAL at 17:51

## 2024-03-27 RX ADMIN — SODIUM CHLORIDE, SODIUM LACTATE, POTASSIUM CHLORIDE, AND CALCIUM CHLORIDE 75 ML/HR: .6; .31; .03; .02 INJECTION, SOLUTION INTRAVENOUS at 08:51

## 2024-03-27 RX ADMIN — ACETAMINOPHEN 650 MG: 325 TABLET, FILM COATED ORAL at 14:44

## 2024-03-27 RX ADMIN — LIDOCAINE HYDROCHLORIDE 5 ML: 10 INJECTION, SOLUTION EPIDURAL; INFILTRATION; INTRACAUDAL at 16:10

## 2024-03-27 RX ADMIN — SODIUM CHLORIDE 1000 MG: 0.9 INJECTION, SOLUTION INTRAVENOUS at 17:43

## 2024-03-27 RX ADMIN — PANTOPRAZOLE SODIUM 40 MG: 40 INJECTION, POWDER, FOR SOLUTION INTRAVENOUS at 08:45

## 2024-03-27 RX ADMIN — PANTOPRAZOLE SODIUM 40 MG: 40 INJECTION, POWDER, FOR SOLUTION INTRAVENOUS at 20:57

## 2024-03-27 NOTE — UTILIZATION REVIEW
Initial Clinical Review    Admission: Date/Time/Statement:   Admission Orders (From admission, onward)       Ordered        03/26/24 1749  INPATIENT ADMISSION  Once                          Orders Placed This Encounter   Procedures    INPATIENT ADMISSION     Standing Status:   Standing     Number of Occurrences:   1     Order Specific Question:   Level of Care     Answer:   Med Surg [16]     Order Specific Question:   Estimated length of stay     Answer:   More than 2 Midnights     Order Specific Question:   Certification     Answer:   I certify that inpatient services are medically necessary for this patient for a duration of greater than two midnights. See H&P and MD Progress Notes for additional information about the patient's course of treatment.     ED Arrival Information       Expected   -    Arrival   3/26/2024 12:35    Acuity   Urgent              Means of arrival   Walk-In    Escorted by   -    Service   Hospitalist    Admission type   Emergency              Arrival complaint   vision change             Chief Complaint   Patient presents with    Eye Problem     Pt states she cannot see out of her right eye since Sunday.  Reports hx ocular migraine in that eye       Initial Presentation: 43 y.o. female to ED via walk-in from home   Present to ED with right-sided blurry vision for 3 days.  She has symptoms in the past ocular migraines but typically last for few hours and spontaneous resolved.  Typically takes Tylenol and Motrin in the morning which relieves headache throughout the day. Symptoms started on Sunday and have no relief with current home regimen.  She denies pain in the right eye vision fields are intact, does describe a pressure sensation in right eye.   PMHX: migraines, GERD   Admitted to [facility] with DX: Optic neuritis   on exam:  Visual field deficit  Blurriness noted in right eye    MRI of the brain  & orbits w w/o contrast: Right sided optic neuritis with a lesion suspicious for an area of  demyelination likely multiple sclerosis  PLAN: cont ivf; cont solumedrol; cont iv protonix; monitor labs; consult neurology; f/u MRI cervical and thoracic spine; consult IR - lumbar puncture; f/u CFS labs; neuro check Q4h    Date: 3/27/24   Day 2  NEUROLOGY CONSULT   history pertinent for migraine with visual aura presents with 3 days of painless right eye monocular blurry vision.  MRI brain and orbits with and without contrast revealed prechiasmatic and intracanalicular edema and enhancement consistent with optic neuritis as well as right occipital horn periventricular FLAIR hyperintensity and punctate enhancing lesion adjacent to the left occipital horn.  Presentation consistent with clinically isolated syndrome and overall concerning for a CNS demyelinating process.  Technically does not yet meet Mares criteria for diagnosis of multiple sclerosis as radiographic evidence of optic neuritis does not fulfill criteria and the left occipital horn enhancing lesion does not have any apparent clinical correlate.  The prechiasmatic involvement does however raise suspicion for mimics including NMO, MOGAD, neurosarcoidosis, and CNS manifestation of systemic rheumatologic disease. Will need further workup with MRI cervical and thoracic spine with and without contrast as well as lumbar puncture and autoimmune serology.  As symptoms have been present for 3 days now, would favor initiation of high-dose steroids even prior to lumbar puncture.   Plan: cont solumedrol 1 g daily x 5 days; cont PPI during steroid course; After IV steroid course, start prednisone 60 mg daily with taper of 10 mg/week     ED Triage Vitals [03/26/24 1249]   Temperature Pulse Respirations Blood Pressure SpO2   98.1 °F (36.7 °C) 70 12 126/79 99 %      Temp Source Heart Rate Source Patient Position - Orthostatic VS BP Location FiO2 (%)   Oral Monitor Sitting Right arm --      Pain Score       No Pain          Wt Readings from Last 1 Encounters:    03/26/24 83 kg (183 lb)     Additional Vital Signs:   Date/Time Temp Pulse Resp BP MAP (mmHg) SpO2 O2 Device Patient Position - Orthostatic VS   03/27/24 0712 98 °F (36.7 °C) 73 17 130/76 -- 98 % -- Sitting   03/26/24 2225 97.7 °F (36.5 °C) 76 18 124/81 99 96 % None (Room air) Sitting   03/26/24 1918 97.6 °F (36.4 °C) 66 17 130/80 -- 99 % None (Room air) Lying   03/26/24 1435 -- 60 12 107/60 -- 100 % None (Room air) Lying   03/26/24 1249 98.1 °F (36.7 °C) 70 12 126/79 -- 99 % None (Room air) Sitting       EKG: None obtained      Pertinent Labs/Diagnostic Test Results:   MRI follow up neuro   Final Result by Wayne Arreola MD (03/26 7835)      Sagittal double inversion recovery and FLAIR imaging demonstrates increased conspicuity of a focus of T2 prolongation/FLAIR signal hyperintensity within the left middle cerebellar peduncle.      Although sagittal double inversion recovery sequences does not convincingly demonstrate additional areas of periventricular FLAIR signal hyperintensity there is persistent suspicion of mild FLAIR signal hyperintensity adjacent to the occipital horns with    punctate enhancement again noted on the left as correlated with prior examination.      Findings overall remain suspicious for CNS demyelinating disease.         Workstation performed: [id number]         CTA head and neck with and without contrast   Final Result by Shon Leon MD (03/26 1800)      CTA head:   -No large vessel occlusion, high-grade stenosis or aneurysm.      CTA neck:   -Streak artifact from contrast bolus limits evaluation of origin of the right common carotid and right subclavian arteries   -Otherwise no high-grade stenosis, dissection or aneurysm.                     Workstation performed: [id number]         MRI brain and orbits wo and w contrast   Final Result by Wayne Arreola MD (03/26 4539)      Right-sided optic neuritis, as described above.      Subtle periventricular FLAIR signal hyperintensity as well as punctate  enhancing focus adjacent to the left occipital horn. Findings raise a suspicion for underlying demyelinating disease/multiple sclerosis with active enhancement.      Follow-up examination should be performed with dedicated sagittal FLAIR or double inversion recovery imaging.         I personally discussed this study with the ER on 3/26/2024 4:55 PM.                  Workstation performed: [id number]         CT head without contrast   Final Result by Lawson Lema MD (03/26 1423)      No acute intracranial abnormality.                  Workstation performed: [id number]         MRI thoracic spine w wo contrast    (Results Pending)   MRI cervical spine w wo contrast    (Results Pending)   FL IN-patient lumbar puncture    (Results Pending)        Results from last 7 days   Lab Units 03/27/24  0512 03/26/24  1305   WBC Thousand/uL 8.52 7.48   HEMOGLOBIN g/dL 12.0 13.2   HEMATOCRIT % 36.1 39.5   PLATELETS Thousands/uL 251 254   NEUTROS ABS Thousands/µL  --  4.83        Results from last 7 days   Lab Units 03/27/24  0512 03/26/24  1305   SODIUM mmol/L 136 138   POTASSIUM mmol/L 4.0 3.5   CHLORIDE mmol/L 107 103   CO2 mmol/L 19* 26   ANION GAP mmol/L 10 9   BUN mg/dL 18 17   CREATININE mg/dL 0.81 0.94   EGFR ml/min/1.73sq m 89 74   CALCIUM mg/dL 9.0 9.3     Results from last 7 days   Lab Units 03/26/24  1305   AST U/L 14   ALT U/L 11   ALK PHOS U/L 53   TOTAL PROTEIN g/dL 7.5   ALBUMIN g/dL 4.8   TOTAL BILIRUBIN mg/dL 0.43        Results from last 7 days   Lab Units 03/27/24  0512 03/26/24  1305   GLUCOSE RANDOM mg/dL 183* 85        Results from last 7 days   Lab Units 03/27/24  1025   PROTIME seconds 16.3*   INR  1.33*          ED Treatment:   Medication Administration from 03/26/2024 1235 to 03/26/2024 1839         Date/Time Order Dose Route Action     03/26/2024 1308 EDT ketorolac (TORADOL) injection 30 mg 30 mg Intravenous Given     03/26/2024 1323 EDT metoclopramide (REGLAN) injection 10 mg 10 mg Intravenous Given      03/26/2024 1309 EDT diphenhydrAMINE (BENADRYL) injection 25 mg 25 mg Intravenous Given     03/26/2024 1332 EDT magnesium sulfate 2 g/50 mL IVPB (premix) 2 g 2 g Intravenous New Bag     03/26/2024 1302 EDT sodium chloride 0.9 % bolus 1,000 mL 1,000 mL Intravenous New Bag     03/26/2024 1606 EDT Gadobutrol injection (SINGLE-DOSE) SOLN 8 mL 8 mL Intravenous Given     03/26/2024 1645 EDT iohexol (OMNIPAQUE) 350 MG/ML injection (SINGLE-DOSE) 100 mL 80 mL Intravenous Given     03/26/2024 1819 EDT methylPREDNISolone sodium succinate (Solu-MEDROL) 1,000 mg in sodium chloride 0.9 % 250 mL IVPB 1,000 mg Intravenous New Bag            Present on Admission:   Optic neuritis   Ocular migraine      Admitting Diagnosis: Optic neuritis [H46.9]  Blurred vision, right eye [H53.8]  Eye problem [H57.9]  Abnormal finding on MRI of brain [R90.89]    Age/Sex: 43 y.o. female    Admission Orders: SCDs; regular diet    Scheduled Medications:  methylPREDNISolone sodium succinate, 1,000 mg, Intravenous, Daily  pantoprazole, 40 mg, Intravenous, Q12H LIZET      Continuous IV Infusions:  lactated ringers, 75 mL/hr, Intravenous, Continuous      PRN Meds:       IP CONSULT TO NEUROLOGY  INPATIENT CONSULT TO IR  IP CONSULT TO NEUROLOGY    Network Utilization Review Department  ATTENTION: Please call with any questions or concerns to [phone] and carefully listen to the prompts so that you are directed to the right person. All voicemails are confidential.   For Discharge needs, contact Care Management DC Support Team at [phone] opt. [phone]  Send all requests for admission clinical reviews, approved or denied determinations and any other requests to dedicated fax number below belonging to the campus where the patient is receiving treatment. List of dedicated fax numbers for the Facilities:  FACILITY NAME UR FAX NUMBER   ADMISSION DENIALS (Administrative/Medical Necessity) [phone]   DISCHARGE SUPPORT TEAM (NETWORK) [phone]   PARENT CHILD  [facility] (Maternity/NICU/Pediatrics) [phone]   [facility] [phone]   [facility] [phone]   [facility] [phone]   [facility] [phone]   [facility] [phone]   [facility] [phone]   [facility] [phone]   [facility] [phone]   [facility] [phone]   [facility] [phone]   [facility] [phone]   [facility] [phone]

## 2024-03-27 NOTE — DISCHARGE INSTRUCTIONS
Lumbar Puncture     WHAT YOU NEED TO KNOW:   Lumbar puncture (LP) is a procedure in which a needle is inserted in your back and into your spinal canal. This is usually done to collect cerebrospinal fluid (CSF) to check for an infection, inflammation, bleeding, or other conditions that affect the brain. CSF is a clear, protective fluid that flows around the brain and inside the spinal canal. LP may also be done to remove CSF to reduce pressure in the brain.     DISCHARGE INSTRUCTIONS:     Follow up with your healthcare provider as directed: Write down your questions so you remember to ask them during your visits.     Post-lumbar puncture headache: You may develop a headache during the first few hours after your LP that may last for several days. The headache may be mild to severe and may get worse when you sit or stand. The following may help ease a post-lumbar puncture headache:  Drink plenty of liquids: You should drink more liquid than usual after your LP. Ask how much liquid is right for you. Caffeine may be used to treat a headache. Drinks, such as coffee, tea, or some sodas, have caffeine. Ask a Do not drink alcohol.    Lie down: If you have a headache after your lumbar puncture, it may be helpful to lie down and rest.  You may have a slight soreness over the LP area. This is normal.  Remove the band aid or dressing in 24 hours.    Contact Interventional Radiology imediately  at [phone] (BLANKA PATIENTS: Contact Interventional Radiology at [phone]) (JAYDA PATIENTS: Contact Interventional Radiology at [phone]) if any of the following occur:  You have a severe headache that does not get better after you lie down.  Persistent nausea or vomiting   You have a fever.   You have a stiff neck or have trouble thinking clearly.   Your legs, feet, or other parts below the waist feel numb, tingly, or weak.   You have bleeding or a discharge coming from the area where the needle was put into your back.   You  have severe pain in your back or neck.

## 2024-03-27 NOTE — TELEPHONE ENCOUNTER
Patient originally scheduled for MRI cervical and thoracic spines with and without contrast on 3/27/2024 at 3:30pm. Per RN pt is to undergo lumbar puncture at that time. Appointments moved to 3/28/2024 at 7am

## 2024-03-27 NOTE — CONSULTS
TeleConsultation - Neurology   Mirta Lancaster 43 y.o. female MRN: [id number]  Unit/Bed#: -01 Encounter: [id number]        REQUIRED DOCUMENTATION:     1. This service was provided via Telemedicine.  2. Provider located at [facility].  3. TeleMed provider: Luciano England MD.  4. Identify all parties in room with patient during tele consult:  None  5.Patient was then informed that this was a Telemedicine visit and that the exam was being conducted confidentially over secure lines. My office door was closed. No one else was in the room.  Patient acknowledged consent and understanding of privacy and security of the Telemedicine visit, and gave us permission to have the assistant stay in the room in order to assist with the history and to conduct the exam.  I informed the patient that I have reviewed their record in Epic and presented the opportunity for them to ask any questions regarding the visit today.  The patient agreed to participate.             Assessment/Plan     * Optic neuritis  Assessment & Plan  Assessment: 43-year-old female with past medical history pertinent for migraine with visual aura presents with 3 days of painless right eye monocular blurry vision.  MRI brain and orbits with and without contrast revealed prechiasmatic and intracanalicular edema and enhancement consistent with optic neuritis as well as right occipital horn periventricular FLAIR hyperintensity and punctate enhancing lesion adjacent to the left occipital horn.  Presentation consistent with clinically isolated syndrome and overall concerning for a CNS demyelinating process.  Technically does not yet meet Mares criteria for diagnosis of multiple sclerosis as radiographic evidence of optic neuritis does not fulfill criteria and the left occipital horn enhancing lesion does not have any apparent clinical correlate.  The prechiasmatic involvement does however raise suspicion for mimics including NMO, MOGAD, neurosarcoidosis, and CNS  manifestation of systemic rheumatologic disease. Will need further workup with MRI cervical and thoracic spine with and without contrast as well as lumbar puncture and autoimmune serology.  As symptoms have been present for 3 days now, would favor initiation of high-dose steroids even prior to lumbar puncture.     Plan:  -IV methylprednisolone 1 g daily x 5 days (pending clinical course and results of further workup can consider transitioning the last few doses to either outpatient infusion center or oral prednisone equivalent)  -Start PPI for GI prophylaxis during steroid course  -After IV steroid course, start prednisone 60 mg daily with taper of 10 mg/week  -MRI cervical and thoracic spine with and without contrast  -IR lumbar puncture              -Protein, cell count with differential, glucose  -MS panel (oligoclonal bands in serum and CSF, IgG index)  -CSF ACE  -[facility] sendout for CSF soluble IL-2 receptor (Interleukin 2 Receptor, Soluble, CSF  [facility] Test Directory )  -CSF Lyme  -Serum CNS demyelinating disease evaluation profile  -Serum Lyme, SHOLA, RF, CCP, PANTERA panel, SSA/SSB  -Outpatient neuro immunology follow-up in 4wks          Mirta Lancaster will need follow up in in 4 weeks with multiple sclerosis attending. She will not require outpatient neurological testing.    History of Present Illness     Reason for Consult / Principal Problem: R eye vision impairment  Hx and PE limited by: N/A  HPI: Mirta Lancaster is a 43 y.o. female with past medical history pertinent for migraine with visual aura presents with 3 days of painless right eye monocular blurry vision. Reportedly similar to her migrainous visual aura, but lasting much longer than typical. ED exam reportedly notable for visual acuity 20/25 bilaterally, no afferent pupillary defect, visual fields grossly intact, no red desaturation. Basic labs unremarkable. Ocular POCUS without evidence of retinal detachment and optic nerve measuring 0.4  cm. MRI brain and orbits with and without contrast revealed prechiasmatic and intracanalicular edema and enhancement consistent with optic neuritis as well as right occipital horn periventricular FLAIR hyperintensity and punctate enhancing lesion adjacent to the left occipital horn.     At time of my evaluation today patient reports persistent right eye vision impairment.  Describes it is extreme blurriness of the superior temporal part of her right eye in a diagonal fashion.  Notes that this symptom is different from her typical visual aura in that she usually has right eye floaters and it typically lasts about 1 hour and then resolves.  Definitively no pain in the right eye, though she does notice some pressure in that eye when looking to the extreme left.  Also denies any change in color vision.  Denies any other neurologic symptoms over the past few days including headache, dizziness, change in speech, trouble swallowing, numbness/tingling, focal weakness, balance issues, change in bowel/bladder function.  Denies any history of paroxysmal neurologic symptoms lasting greater than 24 hours.  She did have 1 episode of migraine several years ago that had associated RUE paresthesias, but that resolved after 1 hour.  Denies history of Uhthoff phenomenon, Lhermitte's phenomenon, or area posttreatment syndrome.    Past medical history basically only notable for migraine with aura and GERD during pregnancy.  She is not on any maintenance medications for her migraine.  She has no personal history of any other neurologic, autoimmune, or rheumatologic disorders.  Family history notable for multiple sclerosis in a maternal grandfather.  No other family history of rheumatologic or neurologic disorders.    Inpatient consult to Neurology  Consult performed by: Luciano England MD  Consult ordered by: Chelle King MD      Inpatient consult to Neurology  Consult performed by: Luciano England MD  Consult  ordered by: Mirela Rahman MD           Review of Systems: Negative or noncontributory unless otherwise stated above    Historical Information   Past Medical History:   Diagnosis Date    GERD (gastroesophageal reflux disease)     Headache     Hypotension     Irregular heart beat     pt states she can feel her heart skip a beat or pause sometimes    Migraines      Past Surgical History:   Procedure Laterality Date    APPENDECTOMY      BREAST BIOPSY Left 12 years ago    benign    BREAST CYST EXCISION Left     12:00 Benign Fibroadenoma    CERVICAL BIOPSY  W/ LOOP ELECTRODE EXCISION       SECTION      CHEST WALL BIOPSY Left 3/13/2020    Procedure: EXC  Bx of left breast with intra op U/S;  Surgeon: Josie Cooney MD;  Location: [facility];  Service: Surgical Oncology    DORSAL COMPARTMENT RELEASE Bilateral     EGD      KNEE ARTHROSCOPY W/ ACL RECONSTRUCTION      TONSILLECTOMY      US GUIDED BREAST BIOPSY LEFT COMPLETE Left 2020    US GUIDED BREAST BIOPSY RIGHT COMPLETE Right 10/30/2020    WISDOM TOOTH EXTRACTION       Social History   Social History     Substance and Sexual Activity   Alcohol Use Yes    Comment: soically     Social History     Substance and Sexual Activity   Drug Use No     E-Cigarette/Vaping    E-Cigarette Use Never User      E-Cigarette/Vaping Substances    Nicotine No     THC No     CBD No     Flavoring No     Other No     Unknown No      Social History     Tobacco Use   Smoking Status Never   Smokeless Tobacco Never     Family History:   Family History   Problem Relation Age of Onset    Prostate cancer Maternal Grandfather 70    Lung cancer Maternal Grandfather 70    No Known Problems Mother     No Known Problems Father     No Known Problems Daughter     No Known Problems Maternal Grandmother     No Known Problems Paternal Grandmother     No Known Problems Paternal Grandfather     No Known Problems Brother     No Known Problems Daughter     No Known Problems Son     No Known Problems  "Son     No Known Problems Paternal Aunt     Breast cancer Paternal Aunt         60's    No Known Problems Paternal Aunt        Review of previous medical records was completed.     Meds/Allergies   all current active meds have been reviewed, current meds:   Current Facility-Administered Medications   Medication Dose Route Frequency    methylPREDNISolone sodium succinate (Solu-MEDROL) 1,000 mg in sodium chloride 0.9 % 250 mL IVPB  1,000 mg Intravenous Daily    pantoprazole (PROTONIX) injection 40 mg  40 mg Intravenous Q12H LIZET   , and PTA meds:   Prior to Admission Medications   Prescriptions Last Dose Informant Patient Reported? Taking?   Calcium Carb-Cholecalciferol (CALCIUM 500+D PO)  Self Yes No   Sig: Take 1 tablet by mouth daily    Patient not taking: Reported on 2023   Multiple Vitamin (MULTIVITAMIN) tablet Past Week Self Yes Yes   Sig: Take 1 tablet by mouth daily   acetaminophen (TYLENOL) 325 mg tablet  Self Yes No   Sig: Take 650 mg by mouth every 6 (six) hours as needed for mild pain   Patient not taking: Reported on 2023   cetirizine (ZyrTEC) 10 mg tablet  Self Yes No   Sig: Take 10 mg by mouth daily   Patient not taking: Reported on 2023   fluticasone (FLONASE) 50 mcg/act nasal spray  Self Yes No   Si spray into each nostril daily   Patient not taking: Reported on 2023   olopatadine HCl (PATADAY) 0.2 % opth drops  Self No No   Sig: Administer 1 drop to both eyes daily as needed (allergic conjunctivitis)   Patient not taking: Reported on 2023   predniSONE 10 mg tablet  Self No No   Sig: Day 1: 6 tabs  Day 2: 5 tabs Day 3: 4 tabs  Day 4: 3 tabs  Day 5: 2 tabs  Day 6: 1 tab   Patient not taking: Reported on 2023      Facility-Administered Medications: None       No Known Allergies    Objective   Vitals:Blood pressure 130/76, pulse 73, temperature 97.7 °F (36.5 °C), temperature source Tympanic, resp. rate 17, height 5' 7\" (1.702 m), weight 83 kg (183 lb), last " menstrual period 03/08/2024, SpO2 98%, not currently breastfeeding.,Body mass index is 28.66 kg/m².    Intake/Output Summary (Last 24 hours) at 3/27/2024 0729  Last data filed at 3/27/2024 0501  Gross per 24 hour   Intake 1470 ml   Output --   Net 1470 ml       Invasive Devices:   Invasive Devices       Peripheral Intravenous Line  Duration             Peripheral IV 03/26/24 Right Antecubital <1 day                  Telemedicine Neurologic Exam:  MENTAL STATUS: AAOx3. Follows complex commands. Affect normal w/ congruent mood.    LANGUAGE: Speech clear, spontaneous, and fluent. No aphasia or dysarthria.    CRANIAL NERVES:  CN II: Reported monocular right superior temporal visual field defect.  No red desaturation.   CN III, IV, VI: EOM's intact w/o any appreciable nystagmus, gaze palsy, or preference.   CN V: Normal masseter bulk. V1-V3 sensation intact and symmetric bilaterally.   CN VII: Face movement symmetric. Smile, eyebrow raise, eyelid bury symmetric. Nasolabial folds and palpebral folds symmetric.   CN VIII: Hearing grossly intact bilaterally.   CN IX-X: No dysarthria.   CN XI: Shoulder shrug symmetric.   CN XII: Tongue midline.    MOTOR: Normal muscle bulk. No pronator drift or orbiting. No tremors or abnormal involuntary movements appreciated.  Holds all extremities antigravity with apparently full strength.      SENSORY:  Subjectively intact and symmetric to light touch and temperature throughout.    COORDINATION: Finger-to-nose w/ eyes closed intact bilaterally. Heel-to-shin intact bilaterally w/o ataxia. NA (finger-tapping) intact bilaterally. No truncal ataxia.    Lab Results: I have personally reviewed pertinent reports.  , CBC:   Results from last 7 days   Lab Units 03/27/24  0512 03/26/24  1305   WBC Thousand/uL 8.52 7.48   RBC Million/uL 3.93 4.26   HEMOGLOBIN g/dL 12.0 13.2   HEMATOCRIT % 36.1 39.5   MCV fL 92 93   PLATELETS Thousands/uL 251 254   , BMP/CMP:   Results from last 7 days   Lab Units  03/27/24  0512 03/26/24  1305   SODIUM mmol/L 136 138   POTASSIUM mmol/L 4.0 3.5   CHLORIDE mmol/L 107 103   CO2 mmol/L 19* 26   BUN mg/dL 18 17   CREATININE mg/dL 0.81 0.94   CALCIUM mg/dL 9.0 9.3   AST U/L  --  14   ALT U/L  --  11   ALK PHOS U/L  --  53   EGFR ml/min/1.73sq m 89 74     Imaging Studies: I have personally reviewed pertinent reports.   and I have personally reviewed pertinent films in PACS  CT BRAIN - WITHOUT CONTRAST 3/26/2024  IMPRESSION:  No acute intracranial abnormality.    CTA NECK AND BRAIN WITH CONTRAST 3/26/2024  IMPRESSION:  CTA head:  -No large vessel occlusion, high-grade stenosis or aneurysm.     CTA neck:  -Streak artifact from contrast bolus limits evaluation of origin of the right common carotid and right subclavian arteries  -Otherwise no high-grade stenosis, dissection or aneurysm.    MRI OF THE BRAIN AND ORBITS - WITH AND WITHOUT CONTRAST 3/26/2024  IMPRESSION:  Right-sided optic neuritis, as described above.     Subtle periventricular FLAIR signal hyperintensity as well as punctate enhancing focus adjacent to the left occipital horn. Findings raise a suspicion for underlying demyelinating disease/multiple sclerosis with active enhancement.    EKG, Pathology, and Other Studies: I have personally reviewed pertinent reports.    VTE Prophylaxis: Sequential compression device (Venodyne)     Code Status: Level 1 - Full Code  Advance Directive and Living Will:      Power of :    POLST:      Counseling / Coordination of Care  Total time spent today 20 minutes. Greater than 50% of total time was spent with the patient and / or family counseling and / or coordination of care. A description of the counseling / coordination of care: Summarization of workup to date, neck steps in workup, empiric immunotherapy, differential diagnosis, further inpatient testing, outpatient follow-up.

## 2024-03-27 NOTE — SEDATION DOCUMENTATION
Pt tolerated lumbar puncture. Band-aid to site. Vitals stable. Opening pressure 16. Total fluids removed 20.5ml. Specimen collected and sent to lab.  Pt transported back to IP room on stretcher. Bed rest flat for 1hr. Report given to receiving nurse

## 2024-03-27 NOTE — ASSESSMENT & PLAN NOTE
History of migraines stopped BCP and headaches went away.  Migraines started after having a baby.     Continue Tylenol as needed, avoid NSAIDs as patient is on high-dose steroids and currently denies headache & c/o painless right mono ocular blurry vision  Plan as above  neurology consulted

## 2024-03-27 NOTE — ASSESSMENT & PLAN NOTE
POA with 3-day complaints of blurred vision on the right eye. Patient states that similar to migraine presentation the past but lasting much longer than typical  Denies nausea, vomiting, & headache.  No acute neurodeficits noted.   CT of the head was negative,   MRI of the brain  & orbits w w/o contrast: Right sided optic neuritis with a lesion suspicious for an area of demyelination likely multiple sclerosis  Neurology consulted & recommendations as follows:    1 g IV Solu-Medrol daily for 5 days (depends on clinical course) and the taper prednisone from 60 mg daily with taper of 10 mg/week  Will follow-up MRI of the cervical and thoracic spine with and without contrast  IR guided lumbar puncture today and follow-up on the test results  MRI of the cervical and thoracic spine with and without contrast  Obtain CSF labs: Protein, cell count with differential, glucose, MS panel(oligoclonal bands in serum and CSF, IgG index), CSF ACE, [facility] lab sent out for CSF soluble IL-2 receptor, CSF Lyme serum CNS demyelinating disease evaluation profile, serum  SHOLA RF CCP ENP panel SSA SSB  Outpatient neuro neuro immunology f/u  Neurochecks every 4 hours

## 2024-03-27 NOTE — BRIEF OP NOTE (RAD/CATH)
IR LUMBAR PUNCTURE Procedure Note    PATIENT NAME: Mirta Lancaster  : 1980  MRN: [id number]    Pre-op Diagnosis:   1. Optic neuritis    2. Abnormal finding on MRI of brain    3. Blurred vision, right eye      Post-op Diagnosis:   1. Optic neuritis    2. Abnormal finding on MRI of brain    3. Blurred vision, right eye        Surgeon:   BAHMAN Navarro  Assistants:     No qualified resident was available, Resident is only observing    Estimated Blood Loss: minimal  Findings: L2 lumbar puncture.  Opening pressure 16 mm H20    Specimens: 20.5 ml clear CSF    Complications:  none immediate    Anesthesia: local    BAHMAN Navarro     Date: 3/27/2024  Time: 4:26 PM

## 2024-03-27 NOTE — PROGRESS NOTES
[facility]  Progress Note  Name: Mirta Lancaster I  MRN: [id number]  Unit/Bed#: -01 I Date of Admission: 3/26/2024   Date of Service: 3/27/2024 I Hospital Day: 1    Assessment/Plan   * Optic neuritis  Assessment & Plan  POA with 3-day complaints of blurred vision on the right eye. Patient states that similar to migraine presentation the past but lasting much longer than typical  Denies nausea, vomiting, & headache.  No acute neurodeficits noted.   CT of the head was negative,   MRI of the brain  & orbits w w/o contrast: Right sided optic neuritis with a lesion suspicious for an area of demyelination likely multiple sclerosis  Neurology consulted & recommendations as follows:    1 g IV Solu-Medrol daily for 5 days (depends on clinical course) and the taper prednisone from 60 mg daily with taper of 10 mg/week  Will follow-up MRI of the cervical and thoracic spine with and without contrast  IR guided lumbar puncture today and follow-up on the test results  MRI of the cervical and thoracic spine with and without contrast  Obtain CSF labs: Protein, cell count with differential, glucose, MS panel(oligoclonal bands in serum and CSF, IgG index), CSF ACE, [facility] lab sent out for CSF soluble IL-2 receptor, CSF Lyme serum CNS demyelinating disease evaluation profile, serum  SHOLA RF CCP ENP panel SSA SSB  Outpatient neuro neuro immunology f/u  Neurochecks every 4 hours    GERD (gastroesophageal reflux disease)  Assessment & Plan  Chronic.  Not on a current home regimen  Continue PPI BID    Ocular migraine  Assessment & Plan  History of migraines stopped BCP and headaches went away.  Migraines started after having a baby.     Continue Tylenol as needed, avoid NSAIDs as patient is on high-dose steroids and currently denies headache & c/o painless right mono ocular blurry vision  Plan as above  neurology consulted               VTE Pharmacologic Prophylaxis:   Low Risk (Score 0-2) - Encourage  Ambulation.    Mobility:   Basic Mobility Inpatient Raw Score: 24  JH-HLM Goal: 8: Walk 250 feet or more  JH-HLM Achieved: 8: Walk 250 feet ot more  JH-HLM Goal achieved. Continue to encourage appropriate mobility.    Patient Centered Rounds: I performed bedside rounds with nursing staff today.   Discussions with Specialists or Other Care Team Provider: d/w neurology    Education and Discussions with Family / Patient: Patient declined call to .     Total Time Spent on Date of Encounter in care of patient: 45 mins. This time was spent on one or more of the following: performing physical exam; counseling and coordination of care; obtaining or reviewing history; documenting in the medical record; reviewing/ordering tests, medications or procedures; communicating with other healthcare professionals and discussing with patient's family/caregivers.    Current Length of Stay: 1 day(s)  Current Patient Status: Inpatient   Certification Statement: The patient will continue to require additional inpatient hospital stay due to optic neuritis   Discharge Plan: Anticipate discharge in 24-48 hrs to home.    Code Status: Level 1 - Full Code    Subjective:   Mirta  has right-sided blurred vision, denies of headache, denies of fever or chills, no acute overnight events noted.    Objective:     Vitals:   Temp (24hrs), Av.8 °F (36.6 °C), Min:97.6 °F (36.4 °C), Max:98 °F (36.7 °C)    Temp:  [97.6 °F (36.4 °C)-98 °F (36.7 °C)] 98 °F (36.7 °C)  HR:  [66-92] 85  Resp:  [17-18] 17  BP: (109-130)/(60-81) 109/63  SpO2:  [96 %-99 %] 99 %  Body mass index is 28.66 kg/m².     Input and Output Summary (last 24 hours):     Intake/Output Summary (Last 24 hours) at 3/27/2024 1646  Last data filed at 3/27/2024 0933  Gross per 24 hour   Intake 1373 ml   Output --   Net 1373 ml       Physical Exam:   Physical Exam   HEENT-PERRLA, moist oral mucosa  Neck-supple, no JVD elevation   Respiratory-equal air entry bilaterally, no rales or  rhonchi  Cardiovascular system-S1, S2 heard, no murmur or gallops or rubs  Abdomen-soft, nontender, no guarding or rigidity, bowel sounds heard  Extremities-no pedal edema  Peripheral pulses palpable  Musculoskeletal-no contractures  Central nervous system-no acute focal neurological deficit ,no sensory or motor deficit noted.  Skin-no rash noted       Additional Data:     Labs:  Results from last 7 days   Lab Units 03/27/24  0512 03/26/24  1305   WBC Thousand/uL 8.52 7.48   HEMOGLOBIN g/dL 12.0 13.2   HEMATOCRIT % 36.1 39.5   PLATELETS Thousands/uL 251 254   NEUTROS PCT %  --  65   LYMPHS PCT %  --  26   MONOS PCT %  --  7   EOS PCT %  --  1     Results from last 7 days   Lab Units 03/27/24  0512 03/26/24  1305   SODIUM mmol/L 136 138   POTASSIUM mmol/L 4.0 3.5   CHLORIDE mmol/L 107 103   CO2 mmol/L 19* 26   BUN mg/dL 18 17   CREATININE mg/dL 0.81 0.94   ANION GAP mmol/L 10 9   CALCIUM mg/dL 9.0 9.3   ALBUMIN g/dL  --  4.8   TOTAL BILIRUBIN mg/dL  --  0.43   ALK PHOS U/L  --  53   ALT U/L  --  11   AST U/L  --  14   GLUCOSE RANDOM mg/dL 183* 85     Results from last 7 days   Lab Units 03/27/24  1025   INR  1.33*                   Lines/Drains:  Invasive Devices       Peripheral Intravenous Line  Duration             Peripheral IV 03/26/24 Right Antecubital 1 day                          Imaging: Personally reviewed the following imaging: MRI brain and MRI spine    Recent Cultures (last 7 days):         Last 24 Hours Medication List:   Current Facility-Administered Medications   Medication Dose Route Frequency Provider Last Rate    acetaminophen  650 mg Oral Q6H PRN Chelle King MD      butalbital-acetaminophen-caffeine  1 tablet Oral Q4H PRN Chelle King MD      lactated ringers  75 mL/hr Intravenous Continuous Chelle King MD 75 mL/hr (03/27/24 0851)    lidocaine (PF)    PRN BAHMAN Navarro      methylPREDNISolone sodium succinate  1,000 mg Intravenous Daily  Mirela Rahman MD 1,000 mg (03/26/24 1819)    pantoprazole  40 mg Intravenous Q12H LIZET Chelle King MD          Today, Patient Was Seen By: Chelle King MD    **Please Note: This note may have been constructed using a voice recognition system.**

## 2024-03-27 NOTE — ASSESSMENT & PLAN NOTE
Assessment: 43-year-old female with past medical history pertinent for migraine with visual aura presents with 3 days of painless right eye monocular blurry vision.  MRI brain and orbits with and without contrast revealed prechiasmatic and intracanalicular edema and enhancement consistent with optic neuritis as well as right occipital horn periventricular FLAIR hyperintensity and punctate enhancing lesion adjacent to the left occipital horn.  Presentation consistent with clinically isolated syndrome and overall concerning for a CNS demyelinating process.  Technically does not yet meet Mares criteria for diagnosis of multiple sclerosis as radiographic evidence of optic neuritis does not fulfill criteria and the left occipital horn enhancing lesion does not have any apparent clinical correlate.  The prechiasmatic involvement does however raise suspicion for mimics including NMO, MOGAD, neurosarcoidosis, and CNS manifestation of systemic rheumatologic disease. Will need further workup with MRI cervical and thoracic spine with and without contrast as well as lumbar puncture and autoimmune serology.  As symptoms have been present for 3 days now, would favor initiation of high-dose steroids even prior to lumbar puncture.     Plan:  -IV methylprednisolone 1 g daily x 5 days (pending clinical course and results of further workup can consider transitioning the last few doses to either outpatient infusion center or oral prednisone equivalent)  -Start PPI for GI prophylaxis during steroid course  -After IV steroid course, start prednisone 60 mg daily with taper of 10 mg/week  -MRI cervical and thoracic spine with and without contrast  -IR lumbar puncture              -Protein, cell count with differential, glucose  -MS panel (oligoclonal bands in serum and CSF, IgG index)  -CSF ACE  -[facility] sendout for CSF soluble IL-2 receptor (Interleukin 2 Receptor, Soluble, CSF  [facility] Test Directory )  -CSF Lyme  -Serum  CNS demyelinating disease evaluation profile  -Serum Lyme, SHOLA, RF, CCP, PANTERA panel, SSA/SSB  -Outpatient neuro immunology follow-up in 4wks

## 2024-03-27 NOTE — ASSESSMENT & PLAN NOTE
History of migraines stopped BCP and headaches went away.  Migraines started after having a baby.     manageable at home at home with Tylenol and Motrin  Currently denies headache & c/o painless right mono ocular blurry vision  Plan as above  neurology consulted

## 2024-03-27 NOTE — PLAN OF CARE
Problem: Knowledge Deficit  Goal: Patient/family/caregiver demonstrates understanding of disease process, treatment plan, medications, and discharge instructions  Description: Complete learning assessment and assess knowledge base.  Interventions:  - Provide teaching at level of understanding  - Provide teaching via preferred learning methods  Outcome: Progressing     Problem: HEMATOLOGIC - ADULT  Goal: Maintains hematologic stability  Description: INTERVENTIONS  - Assess for signs and symptoms of bleeding or hemorrhage  - Monitor labs  - Administer supportive blood products/factors as ordered and appropriate  Outcome: Progressing

## 2024-03-28 ENCOUNTER — ANESTHESIA (INPATIENT)
Dept: ANESTHESIOLOGY | Facility: HOSPITAL | Age: 44
DRG: 059 | End: 2024-03-28
Payer: COMMERCIAL

## 2024-03-28 ENCOUNTER — APPOINTMENT (INPATIENT)
Dept: MRI IMAGING | Facility: HOSPITAL | Age: 44
DRG: 059 | End: 2024-03-28
Payer: COMMERCIAL

## 2024-03-28 ENCOUNTER — ANESTHESIA EVENT (INPATIENT)
Dept: ANESTHESIOLOGY | Facility: HOSPITAL | Age: 44
DRG: 059 | End: 2024-03-28
Payer: COMMERCIAL

## 2024-03-28 PROBLEM — H53.8 BLURRED VISION, RIGHT EYE: Status: ACTIVE | Noted: 2024-03-28

## 2024-03-28 PROBLEM — D72.829 LEUCOCYTOSIS: Status: ACTIVE | Noted: 2024-03-28

## 2024-03-28 PROBLEM — G97.1 POST LUMBAR PUNCTURE HEADACHE: Status: ACTIVE | Noted: 2024-03-28

## 2024-03-28 PROBLEM — R90.89 ABNORMAL FINDING ON MRI OF BRAIN: Status: ACTIVE | Noted: 2024-03-28

## 2024-03-28 LAB
ANION GAP SERPL CALCULATED.3IONS-SCNC: 9 MMOL/L (ref 4–13)
BUN SERPL-MCNC: 14 MG/DL (ref 5–25)
CALCIUM SERPL-MCNC: 8.9 MG/DL (ref 8.4–10.2)
CHLORIDE SERPL-SCNC: 106 MMOL/L (ref 96–108)
CO2 SERPL-SCNC: 23 MMOL/L (ref 21–32)
CREAT SERPL-MCNC: 0.86 MG/DL (ref 0.6–1.3)
ENA RNP AB SER-ACNC: <0.2 AI (ref 0–0.9)
ENA SM AB SER-ACNC: <0.2 AI (ref 0–0.9)
ENA SS-A AB SER-ACNC: <0.2 AI (ref 0–0.9)
ENA SS-B AB SER-ACNC: <0.2 AI (ref 0–0.9)
ERYTHROCYTE [DISTWIDTH] IN BLOOD BY AUTOMATED COUNT: 13.6 % (ref 11.6–15.1)
GFR SERPL CREATININE-BSD FRML MDRD: 83 ML/MIN/1.73SQ M
GLUCOSE SERPL-MCNC: 136 MG/DL (ref 65–140)
HCT VFR BLD AUTO: 35.6 % (ref 34.8–46.1)
HGB BLD-MCNC: 11.9 G/DL (ref 11.5–15.4)
INR PPP: 1.25 (ref 0.84–1.19)
MAGNESIUM SERPL-MCNC: 2 MG/DL (ref 1.9–2.7)
MCH RBC QN AUTO: 30.4 PG (ref 26.8–34.3)
MCHC RBC AUTO-ENTMCNC: 33.4 G/DL (ref 31.4–37.4)
MCV RBC AUTO: 91 FL (ref 82–98)
PLATELET # BLD AUTO: 246 THOUSANDS/UL (ref 149–390)
PMV BLD AUTO: 10.3 FL (ref 8.9–12.7)
POTASSIUM SERPL-SCNC: 3.9 MMOL/L (ref 3.5–5.3)
PROTHROMBIN TIME: 15.5 SECONDS (ref 11.6–14.5)
RBC # BLD AUTO: 3.91 MILLION/UL (ref 3.81–5.12)
SODIUM SERPL-SCNC: 138 MMOL/L (ref 135–147)
WBC # BLD AUTO: 16.29 THOUSAND/UL (ref 4.31–10.16)

## 2024-03-28 PROCEDURE — 72157 MRI CHEST SPINE W/O & W/DYE: CPT

## 2024-03-28 PROCEDURE — 3E0R3GC INTRODUCTION OF OTHER THERAPEUTIC SUBSTANCE INTO SPINAL CANAL, PERCUTANEOUS APPROACH: ICD-10-PCS | Performed by: STUDENT IN AN ORGANIZED HEALTH CARE EDUCATION/TRAINING PROGRAM

## 2024-03-28 PROCEDURE — 72156 MRI NECK SPINE W/O & W/DYE: CPT

## 2024-03-28 PROCEDURE — 80048 BASIC METABOLIC PNL TOTAL CA: CPT | Performed by: INTERNAL MEDICINE

## 2024-03-28 PROCEDURE — 85027 COMPLETE CBC AUTOMATED: CPT | Performed by: INTERNAL MEDICINE

## 2024-03-28 PROCEDURE — A9585 GADOBUTROL INJECTION: HCPCS | Performed by: INTERNAL MEDICINE

## 2024-03-28 PROCEDURE — C9113 INJ PANTOPRAZOLE SODIUM, VIA: HCPCS | Performed by: INTERNAL MEDICINE

## 2024-03-28 PROCEDURE — 99232 SBSQ HOSP IP/OBS MODERATE 35: CPT | Performed by: INTERNAL MEDICINE

## 2024-03-28 PROCEDURE — 83735 ASSAY OF MAGNESIUM: CPT | Performed by: INTERNAL MEDICINE

## 2024-03-28 PROCEDURE — 86140 C-REACTIVE PROTEIN: CPT | Performed by: INTERNAL MEDICINE

## 2024-03-28 PROCEDURE — 85610 PROTHROMBIN TIME: CPT | Performed by: INTERNAL MEDICINE

## 2024-03-28 RX ORDER — HYDROMORPHONE HCL/PF 1 MG/ML
0.5 SYRINGE (ML) INJECTION EVERY 4 HOURS PRN
Status: DISCONTINUED | OUTPATIENT
Start: 2024-03-28 | End: 2024-03-29 | Stop reason: HOSPADM

## 2024-03-28 RX ORDER — LIDOCAINE HYDROCHLORIDE 10 MG/ML
INJECTION, SOLUTION EPIDURAL; INFILTRATION; INTRACAUDAL; PERINEURAL AS NEEDED
Status: DISCONTINUED | OUTPATIENT
Start: 2024-03-28 | End: 2024-03-29 | Stop reason: HOSPADM

## 2024-03-28 RX ORDER — SODIUM CHLORIDE 9 MG/ML
20 INJECTION, SOLUTION INTRAVENOUS ONCE
Status: CANCELLED | OUTPATIENT
Start: 2024-03-30

## 2024-03-28 RX ORDER — GADOBUTROL 604.72 MG/ML
8 INJECTION INTRAVENOUS
Status: COMPLETED | OUTPATIENT
Start: 2024-03-28 | End: 2024-03-28

## 2024-03-28 RX ORDER — PANTOPRAZOLE SODIUM 40 MG/1
40 TABLET, DELAYED RELEASE ORAL
Status: DISCONTINUED | OUTPATIENT
Start: 2024-03-29 | End: 2024-03-29 | Stop reason: HOSPADM

## 2024-03-28 RX ORDER — MIDAZOLAM HYDROCHLORIDE 2 MG/2ML
INJECTION, SOLUTION INTRAMUSCULAR; INTRAVENOUS AS NEEDED
Status: DISCONTINUED | OUTPATIENT
Start: 2024-03-28 | End: 2024-03-29 | Stop reason: HOSPADM

## 2024-03-28 RX ORDER — ENOXAPARIN SODIUM 100 MG/ML
40 INJECTION SUBCUTANEOUS
Status: DISCONTINUED | OUTPATIENT
Start: 2024-03-28 | End: 2024-03-29 | Stop reason: HOSPADM

## 2024-03-28 RX ADMIN — ENOXAPARIN SODIUM 40 MG: 40 INJECTION SUBCUTANEOUS at 18:53

## 2024-03-28 RX ADMIN — SODIUM CHLORIDE 1000 MG: 0.9 INJECTION, SOLUTION INTRAVENOUS at 15:15

## 2024-03-28 RX ADMIN — MIDAZOLAM HYDROCHLORIDE 2 MG: 1 INJECTION, SOLUTION INTRAMUSCULAR; INTRAVENOUS at 11:46

## 2024-03-28 RX ADMIN — SODIUM CHLORIDE, SODIUM LACTATE, POTASSIUM CHLORIDE, AND CALCIUM CHLORIDE 125 ML/HR: .6; .31; .03; .02 INJECTION, SOLUTION INTRAVENOUS at 17:10

## 2024-03-28 RX ADMIN — PANTOPRAZOLE SODIUM 40 MG: 40 INJECTION, POWDER, FOR SOLUTION INTRAVENOUS at 09:15

## 2024-03-28 RX ADMIN — LIDOCAINE HYDROCHLORIDE 20 MG: 10 INJECTION, SOLUTION EPIDURAL; INFILTRATION; INTRACAUDAL at 11:47

## 2024-03-28 RX ADMIN — LIDOCAINE HYDROCHLORIDE 10 MG: 10 INJECTION, SOLUTION EPIDURAL; INFILTRATION; INTRACAUDAL at 11:53

## 2024-03-28 RX ADMIN — HYDROMORPHONE HYDROCHLORIDE 0.5 MG: 1 INJECTION, SOLUTION INTRAMUSCULAR; INTRAVENOUS; SUBCUTANEOUS at 08:59

## 2024-03-28 RX ADMIN — GADOBUTROL 8 ML: 604.72 INJECTION INTRAVENOUS at 08:44

## 2024-03-28 RX ADMIN — SODIUM CHLORIDE, SODIUM LACTATE, POTASSIUM CHLORIDE, AND CALCIUM CHLORIDE 75 ML/HR: .6; .31; .03; .02 INJECTION, SOLUTION INTRAVENOUS at 02:22

## 2024-03-28 RX ADMIN — ACETAMINOPHEN 650 MG: 325 TABLET, FILM COATED ORAL at 06:49

## 2024-03-28 NOTE — ANESTHESIA PREPROCEDURE EVALUATION
Procedure:  BLOOD PATCH    Clear hx of positional headache s/p LP    No blood thinning medications    INR 1.33 pending recheck   - update 1.25    Relevant Problems   CARDIO   (+) Ocular migraine      GI/HEPATIC   (+) GERD (gastroesophageal reflux disease)      NEURO/PSYCH   (+) Ocular migraine        Physical Exam    Airway    Mallampati score: II  TM Distance: >3 FB  Neck ROM: full     Dental   No notable dental hx     Cardiovascular  Rhythm: regular, Rate: normal, Cardiovascular exam normal    Pulmonary  Pulmonary exam normal Breath sounds clear to auscultation    Other Findings  No gross cranial nerve defects. PERRLA. Double vision not illicited by ocular exam.post-pubertal.      Anesthesia Plan  ASA Score- 2     Anesthesia Type- epidural with ASA Monitors.         Additional Monitors:     Airway Plan:     Comment: Discussed the risks/benefits of blood patch including risk of bleeding/infection/damage to underlying structures and unlikely complication of worsened spinal headache..       Plan Factors-Exercise tolerance (METS): >4 METS.    Chart reviewed.   Existing labs reviewed. Patient summary reviewed.    Patient is not a current smoker.  Patient instructed to abstain from smoking on day of procedure. Patient did not smoke on day of surgery.            Induction-     Postoperative Plan-     Informed Consent- Anesthetic plan and risks discussed with patient.  I personally reviewed this patient with the CRNA. Discussed and agreed on the Anesthesia Plan with the CRNA..

## 2024-03-28 NOTE — ASSESSMENT & PLAN NOTE
Patient complains of headache while in the sitting position, post lumbar puncture headache, consulted anesthesia  for blood patch , and underwent blood patch treatment.  Will give pain management with Dilaudid and Fioricet for headache

## 2024-03-28 NOTE — ASSESSMENT & PLAN NOTE
POA with 3-day complaints of blurred vision on the right eye. Patient states that similar to migraine presentation the past but lasting much longer than typical  Denies nausea, vomiting, & headache.  No acute neurodeficits noted.   CT of the head was negative,   MRI of the brain  & orbits w w/o contrast: Right sided optic neuritis with a lesion suspicious for an area of demyelination likely multiple sclerosis  Neurology consulted & recommendations as follows:    1 g IV Solu-Medrol daily for 5 days (depends on clinical course) and the taper prednisone from 60 mg daily with taper of 10 mg/week  Will follow-up MRI of the cervical and thoracic spine with and without contrast  IR guided lumbar puncture today and follow-up on the test results  MRI of the cervical and thoracic spine with and without contrast  Obtain CSF labs: Protein, cell count with differential, glucose, MS panel(oligoclonal bands in serum and CSF, IgG index), CSF ACE, [facility] lab sent out for CSF soluble IL-2 receptor, CSF Lyme serum CNS demyelinating disease evaluation profile, serum  SHOLA RF CCP ENP panel SSA SSB  Outpatient neuro neuro immunology f/u  CSF  studies suggestive of acute demyelinating event.  Discussed with neurology team.  Plan for to continue IV steroid high-dose 1 g daily and also arrange for infusion center at [facility] or [facility] for infusion of 1 g of steroid on 3/30/2024.

## 2024-03-28 NOTE — ANESTHESIA POSTPROCEDURE EVALUATION
Post-Op Assessment Note    CV Status:  Stable  Pain Score: 0    Pain management: adequate       Mental Status:  Awake and sleepy   Hydration Status:  Stable   PONV Controlled:  None   Airway Patency:  Patent     Post Op Vitals Reviewed: Yes    No anethesia notable event occurred.    Staff: Anesthesiologist           /69 (03/28/24 1222)    Temp 97.8 °F (36.6 °C) (03/28/24 1222)    Pulse 66 (03/28/24 1222)   Resp 20 (03/28/24 1222)    SpO2 97 % (03/28/24 1222)

## 2024-03-28 NOTE — QUICK NOTE
Mirta Lancaster is a 43yoF with pmhx of GERD, migraine and recently optic neuritis s/p LP on 3/27/24.     This morning she began with symptomss consistent with post-dural punture headache. She is experiencing photophobia, positional headache that improved with lying flat, with an unremarkable exam. She denies other neurologic symptoms.    She was received an epidural blood patch at 1215, with 18ml of blood injected sterilely. Her headache immediately resolved and she experienced the expected lower back pressure.    She was warned about possible concerning symptoms and was returned to the floor with instructions to lie flat for 3-4 hours and to avoid straining for 2-3 days. No issues or complications.

## 2024-03-28 NOTE — ANESTHESIA PROCEDURE NOTES
Blood Patch:    Start time: 3/28/2024 11:50 AM  Reason for Blood Patch: spinal headache  Preanesthetic Checklist:  Completed: patient identified, site marked, pre-op evaluation, timeout performed, IV checked, risks and benefits discussed, monitors and equipment checked and anesthesia consent given  Procedure Information:  Location of venous blood draw: arm  Autologous blood collection amount (mL): 18.  Patient position: sitting  Prep: povidone-iodine    Approach: midline  Injection technique: FAB saline  Location: L2-3  Needle and Epidural Catheter:  Injection method: Touhy needle  Needle gauge: 20G  Needle length: 10 (9) cm  Loss of resistance depth: 5 cm    Assessment:  Events: well tolerated  Additional Notes:  Epidural space easily access one level above the previous needle insertion site, presumably entering at L2-3 based on anatomical landmarks. Easy FAB, with saline. Attempt to withdraw sterile blood with inadequate sample requiring 2nd attempt. Blood injected in small increments. JEFFERY improved to 6/10 at 10ml, 3/10 at 13ml, and resolved at 16ml. Back pressure began at 18ml and injection completed. No issues or complications.

## 2024-03-28 NOTE — UTILIZATION REVIEW
NOTIFICATION OF INPATIENT ADMISSION   AUTHORIZATION REQUEST   SERVICING FACILITY:   [place] [address]  Tax ID: [id number]  NPI: [id number] ATTENDING PROVIDER:  Attending Name and NPI#: Chelle King Md [[id number]]  Address: [address] [place] [address]  Phone:  [phone]     ADMISSION INFORMATION:  Place of Service: Inpatient [facility]  Place of Service Code: 21  Inpatient Admission Date/Time: 3/26/24  5:49 PM  Discharge Date/Time: No discharge date for patient encounter.  Admitting Diagnosis Code/Description:  Optic neuritis [H46.9]  Blurred vision, right eye [H53.8]  Eye problem [H57.9]  Abnormal finding on MRI of brain [R90.89]     UTILIZATION REVIEW CONTACT:  Vilma Nicholson, Utilization   Network Utilization Review Department  Phone: [phone]  Fax: [phone]  Email: [email or web address]  Contact for approvals/pending authorizations, clinical reviews, and discharge.     PHYSICIAN ADVISORY SERVICES:  Medical Necessity Denial & Hjig-ce-Uyub Review  Phone: [phone]  Fax: [phone]  Email: [email or web address]     DISCHARGE SUPPORT TEAM:  For Patients Discharge Needs & Updates  Phone: [phone] opt. 2 Fax: [phone]  Email: [email or web address]

## 2024-03-28 NOTE — PLAN OF CARE
Problem: PAIN - ADULT  Goal: Verbalizes/displays adequate comfort level or baseline comfort level  Description: Interventions:  - Encourage patient to monitor pain and request assistance  - Assess pain using appropriate pain scale  - Administer analgesics based on type and severity of pain and evaluate response  - Implement non-pharmacological measures as appropriate and evaluate response  - Consider cultural and social influences on pain and pain management  - Notify physician/advanced practitioner if interventions unsuccessful or patient reports new pain  Outcome: Progressing     Problem: INFECTION - ADULT  Goal: Absence or prevention of progression during hospitalization  Description: INTERVENTIONS:  - Assess and monitor for signs and symptoms of infection  - Monitor lab/diagnostic results  - Monitor all insertion sites, i.e. indwelling lines, tubes, and drains  - Monitor endotracheal if appropriate and nasal secretions for changes in amount and color  - Taylor appropriate cooling/warming therapies per order  - Administer medications as ordered  - Instruct and encourage patient and family to use good hand hygiene technique  - Identify and instruct in appropriate isolation precautions for identified infection/condition  Outcome: Progressing     Problem: SAFETY ADULT  Goal: Patient will remain free of falls  Description: INTERVENTIONS:  - Educate patient/family on patient safety including physical limitations  - Instruct patient to call for assistance with activity   - Consult OT/PT to assist with strengthening/mobility   - Keep Call bell within reach  - Keep bed low and locked with side rails adjusted as appropriate  - Keep care items and personal belongings within reach  - Initiate and maintain comfort rounds  - Make Fall Risk Sign visible to staff  - Offer Toileting every 2 Hours, in advance of need  - Apply yellow socks and bracelet for high fall risk patients  - Consider moving patient to room near nurses  station  Outcome: Progressing     Problem: DISCHARGE PLANNING  Goal: Discharge to home or other facility with appropriate resources  Description: INTERVENTIONS:  - Identify barriers to discharge w/patient and caregiver  - Arrange for needed discharge resources and transportation as appropriate  - Identify discharge learning needs (meds, wound care, etc.)  - Arrange for interpretive services to assist at discharge as needed  - Refer to Case Management Department for coordinating discharge planning if the patient needs post-hospital services based on physician/advanced practitioner order or complex needs related to functional status, cognitive ability, or social support system  Outcome: Progressing     Problem: Knowledge Deficit  Goal: Patient/family/caregiver demonstrates understanding of disease process, treatment plan, medications, and discharge instructions  Description: Complete learning assessment and assess knowledge base.  Interventions:  - Provide teaching at level of understanding  - Provide teaching via preferred learning methods  Outcome: Progressing     Problem: HEMATOLOGIC - ADULT  Goal: Maintains hematologic stability  Description: INTERVENTIONS  - Assess for signs and symptoms of bleeding or hemorrhage  - Monitor labs  - Administer supportive blood products/factors as ordered and appropriate  Outcome: Progressing

## 2024-03-28 NOTE — PROGRESS NOTES
[facility]  Progress Note  Name: Mirta Lancaster I  MRN: [id number]  Unit/Bed#: -01 I Date of Admission: 3/26/2024   Date of Service: 3/28/2024 I Hospital Day: 2    Assessment/Plan   * Optic neuritis  Assessment & Plan  POA with 3-day complaints of blurred vision on the right eye. Patient states that similar to migraine presentation the past but lasting much longer than typical  Denies nausea, vomiting, & headache.  No acute neurodeficits noted.   CT of the head was negative,   MRI of the brain  & orbits w w/o contrast: Right sided optic neuritis with a lesion suspicious for an area of demyelination likely multiple sclerosis  Neurology consulted & recommendations as follows:    1 g IV Solu-Medrol daily for 5 days (depends on clinical course) and the taper prednisone from 60 mg daily with taper of 10 mg/week  Will follow-up MRI of the cervical and thoracic spine with and without contrast  IR guided lumbar puncture today and follow-up on the test results  MRI of the cervical and thoracic spine with and without contrast  Obtain CSF labs: Protein, cell count with differential, glucose, MS panel(oligoclonal bands in serum and CSF, IgG index), CSF ACE, [facility] lab sent out for CSF soluble IL-2 receptor, CSF Lyme serum CNS demyelinating disease evaluation profile, serum  SHOLA RF CCP ENP panel SSA SSB  Outpatient neuro neuro immunology f/u  CSF  studies suggestive of acute demyelinating event.  Discussed with neurology team.  Plan for to continue IV steroid high-dose 1 g daily and also arrange for infusion center at [facility] or [facility] for infusion of 1 g of steroid on 3/30/2024.    Post lumbar puncture headache  Assessment & Plan  Patient complains of headache while in the sitting position, post lumbar puncture headache, consulted anesthesia  for blood patch , and underwent blood patch treatment.  Will give pain management with Dilaudid and Fioricet for headache    Leucocytosis  Assessment &  Plan  leucocytosis is likely secondary to high-dose of IV steroid, no evidence of active infection.    GERD (gastroesophageal reflux disease)  Assessment & Plan  Chronic.  Not on a current home regimen  Continue PPI BID    Ocular migraine  Assessment & Plan  History of migraines stopped BCP and headaches went away.  Migraines started after having a baby.     Continue Tylenol as needed, avoid NSAIDs as patient is on high-dose steroids and currently denies headache & c/o painless right mono ocular blurry vision  Plan as above  neurology consulted               VTE Pharmacologic Prophylaxis:   Low Risk (Score 0-2) - Encourage Ambulation.    Mobility:   Basic Mobility Inpatient Raw Score: 24  JH-HLM Goal: 8: Walk 250 feet or more  JH-HLM Achieved: 8: Walk 250 feet ot more  JH-HLM Goal achieved. Continue to encourage appropriate mobility.    Patient Centered Rounds: I performed bedside rounds with nursing staff today.   Discussions with Specialists or Other Care Team Provider: d/w     Education and Discussions with Family / Patient: Patient declined call to .     Total Time Spent on Date of Encounter in care of patient: 45 mins. This time was spent on one or more of the following: performing physical exam; counseling and coordination of care; obtaining or reviewing history; documenting in the medical record; reviewing/ordering tests, medications or procedures; communicating with other healthcare professionals and discussing with patient's family/caregivers.    Current Length of Stay: 2 day(s)  Current Patient Status: Inpatient   Certification Statement: The patient will continue to require additional inpatient hospital stay due to optic neuritis   Discharge Plan: Anticipate discharge in 24-48 hrs to home.    Code Status: Level 1 - Full Code    Subjective:     Complains of headache specially while sitting up, also right eye blurred vision persists, pt is afebrile   Objective:     Vitals:   Temp (24hrs),  Av °F (36.7 °C), Min:97.1 °F (36.2 °C), Max:98.7 °F (37.1 °C)    Temp:  [97.1 °F (36.2 °C)-98.7 °F (37.1 °C)] 97.1 °F (36.2 °C)  HR:  [61-92] 61  Resp:  [17-20] 20  BP: (109-130)/(60-72) 120/63  SpO2:  [94 %-100 %] 98 %  Body mass index is 28.66 kg/m².     Input and Output Summary (last 24 hours):     Intake/Output Summary (Last 24 hours) at 3/28/2024 1353  Last data filed at 3/28/2024 0601  Gross per 24 hour   Intake 1546 ml   Output --   Net 1546 ml       Physical Exam:   Physical Exam   HEENT-PERRLA, moist oral mucosa  Neck-supple, no JVD elevation   Respiratory-equal air entry bilaterally, no rales or rhonchi  Cardiovascular system-S1, S2 heard, no murmur or gallops or rubs  Abdomen-soft, nontender, no guarding or rigidity, bowel sounds heard  Extremities-no pedal edema  Peripheral pulses palpable  Musculoskeletal-no contractures  Central nervous system-no acute focal neurological deficit ,no sensory or motor deficit noted.  Skin-no rash noted       Additional Data:     Labs:  Results from last 7 days   Lab Units 24  1006 24  0524  1305   WBC Thousand/uL 16.29*   < > 7.48   HEMOGLOBIN g/dL 11.9   < > 13.2   HEMATOCRIT % 35.6   < > 39.5   PLATELETS Thousands/uL 246   < > 254   NEUTROS PCT %  --   --  65   LYMPHS PCT %  --   --  26   MONOS PCT %  --   --  7   EOS PCT %  --   --  1    < > = values in this interval not displayed.     Results from last 7 days   Lab Units 24  1006 24  0512 24  1305   SODIUM mmol/L 138   < > 138   POTASSIUM mmol/L 3.9   < > 3.5   CHLORIDE mmol/L 106   < > 103   CO2 mmol/L 23   < > 26   BUN mg/dL 14   < > 17   CREATININE mg/dL 0.86   < > 0.94   ANION GAP mmol/L 9   < > 9   CALCIUM mg/dL 8.9   < > 9.3   ALBUMIN g/dL  --   --  4.8   TOTAL BILIRUBIN mg/dL  --   --  0.43   ALK PHOS U/L  --   --  53   ALT U/L  --   --  11   AST U/L  --   --  14   GLUCOSE RANDOM mg/dL 136   < > 85    < > = values in this interval not displayed.     Results from last  7 days   Lab Units 03/28/24  1006   INR  1.25*                   Lines/Drains:  Invasive Devices       Peripheral Intravenous Line  Duration             Peripheral IV 03/26/24 Right Antecubital 2 days                          Imaging: Personally reviewed the following imaging: MRI brain    Recent Cultures (last 7 days):   Results from last 7 days   Lab Units 03/27/24  1645   GRAM STAIN RESULT  No No Polys or Bacteria seen       Last 24 Hours Medication List:   Current Facility-Administered Medications   Medication Dose Route Frequency Provider Last Rate    acetaminophen  650 mg Oral Q6H PRN Chelle King MD      butalbital-acetaminophen-caffeine  1 tablet Oral Q4H PRN Chelle King MD      HYDROmorphone  0.5 mg Intravenous Q4H PRN Chelle King MD      lactated ringers  125 mL/hr Intravenous Continuous Chelle King  mL/hr (03/28/24 0858)    methylPREDNISolone sodium succinate  1,000 mg Intravenous Daily Chelle King MD      pantoprazole  40 mg Intravenous Q12H LIZET Chelle King MD       Facility-Administered Medications Ordered in Other Encounters   Medication Dose Route Frequency Provider Last Rate    lidocaine (PF)   Intravenous PRN Nayan Cardenas MD      midazolam   Intravenous PRN Nayan Cardenas MD          Today, Patient Was Seen By: Chelle King MD    **Please Note: This note may have been constructed using a voice recognition system.**

## 2024-03-29 VITALS
TEMPERATURE: 98.3 F | WEIGHT: 183 LBS | HEIGHT: 67 IN | DIASTOLIC BLOOD PRESSURE: 72 MMHG | RESPIRATION RATE: 18 BRPM | BODY MASS INDEX: 28.72 KG/M2 | HEART RATE: 54 BPM | OXYGEN SATURATION: 98 % | SYSTOLIC BLOOD PRESSURE: 137 MMHG

## 2024-03-29 LAB
AQP4 H2O CHANNEL IGG SERPL QL IF: NEGATIVE
C GATTII+NEOFOR DNA CSF QL NAA+NON-PROBE: NOT DETECTED
CCP AB SER IA-ACNC: 0.8
CMV DNA CSF QL NAA+NON-PROBE: NOT DETECTED
CRP SERPL QL: <1 MG/L
E COLI K1 DNA CSF QL NAA+NON-PROBE: NOT DETECTED
ERYTHROCYTE [DISTWIDTH] IN BLOOD BY AUTOMATED COUNT: 13.5 % (ref 11.6–15.1)
ERYTHROCYTE [SEDIMENTATION RATE] IN BLOOD: 1 MM/HOUR (ref 0–19)
EV RNA CSF QL NAA+NON-PROBE: NOT DETECTED
GP B STREP DNA CSF QL NAA+NON-PROBE: NOT DETECTED
HAEM INFLU DNA CSF QL NAA+NON-PROBE: NOT DETECTED
HCT VFR BLD AUTO: 35.4 % (ref 34.8–46.1)
HGB BLD-MCNC: 11.9 G/DL (ref 11.5–15.4)
HHV6 DNA CSF QL NAA+NON-PROBE: NOT DETECTED
HSV1 DNA CSF QL NAA+NON-PROBE: NOT DETECTED
HSV2 DNA CSF QL NAA+NON-PROBE: NOT DETECTED
L MONOCYTOG DNA CSF QL NAA+NON-PROBE: NOT DETECTED
MCH RBC QN AUTO: 30.8 PG (ref 26.8–34.3)
MCHC RBC AUTO-ENTMCNC: 33.6 G/DL (ref 31.4–37.4)
MCV RBC AUTO: 92 FL (ref 82–98)
MOG AB SER QL CBA IFA: NEGATIVE
N MEN DNA CSF QL NAA+NON-PROBE: NOT DETECTED
PARECHOVIRUS A RNA CSF QL NAA+NON-PROBE: NOT DETECTED
PLATELET # BLD AUTO: 234 THOUSANDS/UL (ref 149–390)
PMV BLD AUTO: 10.8 FL (ref 8.9–12.7)
RBC # BLD AUTO: 3.86 MILLION/UL (ref 3.81–5.12)
S PNEUM DNA CSF QL NAA+NON-PROBE: NOT DETECTED
VZV DNA CSF QL NAA+NON-PROBE: NOT DETECTED
WBC # BLD AUTO: 17.36 THOUSAND/UL (ref 4.31–10.16)

## 2024-03-29 PROCEDURE — 85652 RBC SED RATE AUTOMATED: CPT | Performed by: INTERNAL MEDICINE

## 2024-03-29 PROCEDURE — 99239 HOSP IP/OBS DSCHRG MGMT >30: CPT | Performed by: INTERNAL MEDICINE

## 2024-03-29 PROCEDURE — 85027 COMPLETE CBC AUTOMATED: CPT | Performed by: INTERNAL MEDICINE

## 2024-03-29 RX ORDER — PANTOPRAZOLE SODIUM 40 MG/1
40 TABLET, DELAYED RELEASE ORAL
Qty: 60 TABLET | Refills: 0 | Status: SHIPPED | OUTPATIENT
Start: 2024-03-29

## 2024-03-29 RX ORDER — PREDNISONE 20 MG/1
TABLET ORAL DAILY
Qty: 74 TABLET | Refills: 0 | Status: SHIPPED | OUTPATIENT
Start: 2024-03-29 | End: 2024-05-10

## 2024-03-29 RX ADMIN — PANTOPRAZOLE SODIUM 40 MG: 40 TABLET, DELAYED RELEASE ORAL at 07:53

## 2024-03-29 RX ADMIN — PANTOPRAZOLE SODIUM 40 MG: 40 TABLET, DELAYED RELEASE ORAL at 16:07

## 2024-03-29 RX ADMIN — SODIUM CHLORIDE 1000 MG: 0.9 INJECTION, SOLUTION INTRAVENOUS at 14:07

## 2024-03-29 RX ADMIN — ENOXAPARIN SODIUM 40 MG: 40 INJECTION SUBCUTANEOUS at 08:28

## 2024-03-29 NOTE — INCIDENTAL FINDINGS
The following findings require follow up:  Radiographic finding   Finding: Right-sided optic neuritis, as described above.     Subtle periventricular FLAIR signal hyperintensity as well as punctate enhancing focus adjacent to the left occipital horn. Findings raise a suspicion for underlying demyelinating disease/multiple sclerosis with active enhancement.      Follow up required: yes   Follow up should be done within 1-2 week(s)    Please notify the following clinician to assist with the follow up:   shawanda Clayton , Dr. Katey isidro or PCP

## 2024-03-29 NOTE — ASSESSMENT & PLAN NOTE
POA with 3-day complaints of blurred vision on the right eye. Patient states that similar to migraine presentation the past but lasting much longer than typical  Denies nausea, vomiting, & headache.  No acute neurodeficits noted.   CT of the head was negative,   MRI of the brain  & orbits w w/o contrast: Right sided optic neuritis with a lesion suspicious for an area of demyelination likely multiple sclerosis  Neurology consulted & recommendations as follows:    1 g IV Solu-Medrol daily for 5 days (depends on clinical course) and the taper prednisone from 60 mg daily with taper of 10 mg/week  Will follow-up MRI of the cervical and thoracic spine with and without contrast  IR guided lumbar puncture today and follow-up on the test results  MRI of the cervical and thoracic spine with and without contrast  Obtain CSF labs: Protein, cell count with differential, glucose, MS panel(oligoclonal bands in serum and CSF, IgG index), CSF ACE, [facility] lab sent out for CSF soluble IL-2 receptor, CSF Lyme serum CNS demyelinating disease evaluation profile, serum  SHOLA RF CCP ENP panel SSA SSB  Outpatient neuro neuro immunology f/u  CSF  studies suggestive of acute demyelinating event.  Discussed with neurology team.  Plan for to continue IV steroid high-dose 1 g daily and also arrange for infusion center at [facility] or [facility] for infusion of 1 g of steroid on 3/30/2024.

## 2024-03-29 NOTE — DISCHARGE SUMMARY
[facility]  Discharge- Mirta Lancaster 1980, 43 y.o. female MRN: [id number]  Unit/Bed#: -01 Encounter: [id number]  Primary Care Provider: Stephen Kimball DO   Date and time admitted to hospital: 3/26/2024 12:49 PM    * Optic neuritis  Assessment & Plan  POA with 3-day complaints of blurred vision on the right eye. Patient states that similar to migraine presentation the past but lasting much longer than typical  Denies nausea, vomiting, & headache.  No acute neurodeficits noted.   CT of the head was negative,   MRI of the brain  & orbits w w/o contrast: Right sided optic neuritis with a lesion suspicious for an area of demyelination likely multiple sclerosis  Neurology consulted & recommendations as follows:    1 g IV Solu-Medrol daily for 5 days (depends on clinical course) and the taper prednisone from 60 mg daily with taper of 10 mg/week  Will follow-up MRI of the cervical and thoracic spine with and without contrast  IR guided lumbar puncture today and follow-up on the test results  MRI of the cervical and thoracic spine with and without contrast  Obtain CSF labs: Protein, cell count with differential, glucose, MS panel(oligoclonal bands in serum and CSF, IgG index), CSF ACE, [facility] lab sent out for CSF soluble IL-2 receptor, CSF Lyme serum CNS demyelinating disease evaluation profile, serum  SHOLA RF CCP ENP panel SSA SSB  Outpatient neuro neuro immunology f/u  CSF  studies suggestive of acute demyelinating event.  Discussed with neurology team.  Plan for to continue IV steroid high-dose 1 g daily and also arrange for infusion center at [facility] or [place] for infusion of 1 g of steroid on 3/30/2024.    Post lumbar puncture headache  Assessment & Plan  Patient complains of headache while in the sitting position, post lumbar puncture headache, consulted anesthesia  for blood patch , and underwent blood patch treatment.  Will give pain management with Dilaudid and Fioricet for  headache    Leucocytosis  Assessment & Plan  leucocytosis is likely secondary to high-dose of IV steroid, no evidence of active infection.    GERD (gastroesophageal reflux disease)  Assessment & Plan  Chronic.  Not on a current home regimen  Continue PPI BID    Ocular migraine  Assessment & Plan  History of migraines stopped BCP and headaches went away.  Migraines started after having a baby.     Continue Tylenol as needed, avoid NSAIDs as patient is on high-dose steroids and currently denies headache & c/o painless right mono ocular blurry vision  Plan as above  neurology consulted        Medical Problems       Resolved Problems  Date Reviewed: 3/29/2024   None       Discharging Physician / Practitioner: Chelle King MD  PCP: Stephen Kimball DO  Admission Date:   Admission Orders (From admission, onward)       Ordered        03/26/24 1749  INPATIENT ADMISSION  Once                          Discharge Date: 03/29/24    Consultations During Hospital Stay:  Neurology consultation,    Procedures Performed:   MRI of the brain and cervical and thoracic spine,  Lumbar puncture by IR     Blood patch by anesthesia for post lumbar puncture headache    Significant Findings / Test Results:   nil    Incidental Findings:   MRI of brain shows Right-sided optic neuritis, as described above.   Subtle periventricular FLAIR signal hyperintensity as well as punctate enhancing focus adjacent to the left occipital horn. Findings raise a suspicion for underlying demyelinating disease/multiple sclerosis with active enhancement.   I reviewed the above mentioned incidental findings with the patient and/or family and they expressed understanding.    Test Results Pending at Discharge (will require follow up):   CSF final fluid culture  CSF study with MS panel, ALT pending tests like Sjogren's antibodies and antinuclear antibody, CCP antibody, SHOLA,     Outpatient Tests Requested:  Ophthalmology evaluation by Dr. Lubin to  schedule on Monday 4/1/24    Complications:  nil    Reason for Admission: rt eye blurred vision     Hospital Course:   Mirta Lancaster is a 43 y.o. female patient who originally presented to the hospital on 3/26/2024 due to 3-day complaints of blurred vision on the right eye.  Patient has a history of migraine headache, presented to work however continued to have blurred vision on 3/26/2024, underwent CT of the head and CTA head and neck which was negative, MRI of the brain and orbits with and without contrast was done showed right-sided optic neuritis with a lesion suspicious for an area of demyelination likely multiple sclerosis.  Neurology was consulted recommended IV Solu-Medrol 1 g for 5 days and taper prednisone from 60 mg daily to 10 mg/week.  Also underwent MRI of the cervical and thoracic spine no evidence of demyelinating disease noted, patient also underwent IR guided lumbar puncture and CSF was sent for protein, cell count with differential glucose and MS panel, oligoclonal bands in serum and CSF IgG index and [facility] lab send out for CSF soluble IL-2 receptor and CSF Lyme and SHOLA and CCP.  Initial results of the CSF were negative for bacterial or viral meningitis.  However CSF culture and Gram stain showed growth in broth culture only of gram-positive cocci, no polyps or bacteria seen.  Meningitis/encephalitis panel was negative.  Plan was discussed in length with the neurologist and infectious disease specialist Dr. Beltran.  Patient's clinical presentation does not fit with viral meningoencephalitis or bacterial meningitis picture.  No other infectious symptoms noted her white cell count was elevated likely with high-dose steroid initiation.  The bacterial culture is most consistent with Staphylococcus which is almost certainly a skin contaminant.  And will follow the final  CSF culture results.  Patient is a scheduled for 1 g of IV steroid dose by peripheral IV access at infusion center in [facility]  "on 3/30/2024.  Patient will need ophthalmology evaluation which is also scheduled on 4/1/2024 with Dr. Lubin.  Recommended eyepatch for the right eye.  Needs followup with  with MS specialist in the network in 1-2 weeks based on CSFMS  studies which is pending .        Please see above list of diagnoses and related plan for additional information.     Condition at Discharge: good    Discharge Day Visit / Exam:   Subjective: Still has right eye blurred vision, denies of any headache,  Vitals: Blood Pressure: 137/72 (03/29/24 0745)  Pulse: (!) 54 (03/29/24 0745)  Temperature: 98.3 °F (36.8 °C) (03/29/24 0745)  Temp Source: Oral (03/29/24 0745)  Respirations: 18 (03/29/24 0745)  Height: 5' 7\" (170.2 cm) (03/26/24 1918)  Weight - Scale: 83 kg (183 lb) (03/26/24 1918)  SpO2: 98 % (03/29/24 0745)  Exam:   Physical Exam   HEENT-PERRLA, moist oral mucosa  Neck-supple, no JVD elevation   Respiratory-equal air entry bilaterally, no rales or rhonchi  Cardiovascular system-S1, S2 heard, no murmur or gallops or rubs  Abdomen-soft, nontender, no guarding or rigidity, bowel sounds heard  Extremities-no pedal edema  Peripheral pulses palpable  Musculoskeletal-no contractures  Central nervous system-no acute focal neurological deficit ,no sensory or motor deficit noted.  Skin-no rash noted       Discussion with Family: Updated  () at bedside.    Discharge instructions/Information to patient and family:   See after visit summary for information provided to patient and family.      Provisions for Follow-Up Care:  See after visit summary for information related to follow-up care and any pertinent home health orders.      Mobility at time of Discharge:   Basic Mobility Inpatient Raw Score: 24  JH-HLM Goal: 8: Walk 250 feet or more  JH-HLM Achieved: 7: Walk 25 feet or more  HLM Goal achieved. Continue to encourage appropriate mobility.     Disposition:   Home    Planned Readmission: nil     Discharge Statement:  I " spent 60 minutes discharging the patient. This time was spent on the day of discharge. I had direct contact with the patient on the day of discharge. Greater than 50% of the total time was spent examining patient, answering all patient questions, arranging and discussing plan of care with patient as well as directly providing post-discharge instructions.  Additional time then spent on discharge activities.    Discharge Medications:  See after visit summary for reconciled discharge medications provided to patient and/or family.      **Please Note: This note may have been constructed using a voice recognition system**

## 2024-03-29 NOTE — QUICK NOTE
BRIEF NEUROLOGY UPDATE NOTE    MRI C and T-spine without other foci of demyelination.  Basic CSF studies notable for mild pleocytosis (9), normal protein, and mildly elevated glucose, consistent with an acute neuro inflammatory process.  More specific neuro inflammatory tests are still pending.  CSF Gram stain negative, but CSF culture returned showing growth in both cultures of GPC in pairs and short chains.  Clinically patient does not have a bacterial meningitis picture, her MRI is inconsistent with that diagnosis, and her CSF cell count and glucose would be consistent with a bacterial meningitis.  This CSF culture finding almost certainly represents contamination from staph species of skin emanuel and I do not feel strongly that any further workup or empiric antimicrobials are warranted.  Infectious disease had recommended adding on an ME panel, but apparently there is not enough CSF left in the lab to do so.  Similarly, patient neither clinically nor radiographically fits a viral meningoencephalitis picture and has a better explanation for mild pleocytosis.  Especially in setting of her recent postdural puncture headache s/p blood patch, I would not recommend a repeat lumbar puncture just to obtain an ME panel.    Luciano England MD  Neurology  03/29/24    This note was completed in part utilizing Dragon Dictation Software. Grammatical errors, random word insertions, spelling mistakes, and incomplete sentences may be an occasional consequence of this system secondary to software limitations, ambient noise, and hardware issues.  If you have any questions or concerns about the content, text, or information contained within the body of this dictation, please contact the provider for clarification.

## 2024-03-29 NOTE — PLAN OF CARE
Problem: PAIN - ADULT  Goal: Verbalizes/displays adequate comfort level or baseline comfort level  Description: Interventions:  - Encourage patient to monitor pain and request assistance  - Assess pain using appropriate pain scale  - Administer analgesics based on type and severity of pain and evaluate response  - Implement non-pharmacological measures as appropriate and evaluate response  - Consider cultural and social influences on pain and pain management  - Notify physician/advanced practitioner if interventions unsuccessful or patient reports new pain  Outcome: Progressing     Problem: INFECTION - ADULT  Goal: Absence or prevention of progression during hospitalization  Description: INTERVENTIONS:  - Assess and monitor for signs and symptoms of infection  - Monitor lab/diagnostic results  - Monitor all insertion sites, i.e. indwelling lines, tubes, and drains  - Monitor endotracheal if appropriate and nasal secretions for changes in amount and color  - [place] appropriate cooling/warming therapies per order  - Administer medications as ordered  - Instruct and encourage patient and family to use good hand hygiene technique  - Identify and instruct in appropriate isolation precautions for identified infection/condition  Outcome: Progressing     Problem: DISCHARGE PLANNING  Goal: Discharge to home or other facility with appropriate resources  Description: INTERVENTIONS:  - Identify barriers to discharge w/patient and caregiver  - Arrange for needed discharge resources and transportation as appropriate  - Identify discharge learning needs (meds, wound care, etc.)  - Arrange for interpretive services to assist at discharge as needed  - Refer to Case Management Department for coordinating discharge planning if the patient needs post-hospital services based on physician/advanced practitioner order or complex needs related to functional status, cognitive ability, or social support system  Outcome: Progressing      Problem: Knowledge Deficit  Goal: Patient/family/caregiver demonstrates understanding of disease process, treatment plan, medications, and discharge instructions  Description: Complete learning assessment and assess knowledge base.  Interventions:  - Provide teaching at level of understanding  - Provide teaching via preferred learning methods  Outcome: Progressing     Problem: HEMATOLOGIC - ADULT  Goal: Maintains hematologic stability  Description: INTERVENTIONS  - Assess for signs and symptoms of bleeding or hemorrhage  - Monitor labs  - Administer supportive blood products/factors as ordered and appropriate  Outcome: Progressing

## 2024-03-29 NOTE — DISCHARGE INSTR - AVS FIRST PAGE
Dear Mirta Lancaster,     It was our pleasure to care for you here at [facility].  It is our hope that we were always able to exceed the expected standards for your care during your stay.  You were hospitalized due to rt blurred vision, optic neuritis.  You were cared for on the medsur  floor under the service of Chelle King MD with the [facility] who covers for your primary care physician (PCP), Stephen Kimball DO, while you were hospitalized.  If you have any questions or concerns related to this hospitalization, you may contact us at .  For follow up as well as medication refills, we recommend that you follow up with your primary care physician.  A registered nurse will reach out to you by phone within a few days after your discharge to answer any additional questions that you may have after going home.  However, at this time we provide for you here, the most important instructions / recommendations at discharge:     Notable Medication Adjustments -   IV Solu-Medrol 1 g ,one  dose on 3/30/2024 at [facility]   Prednisione 60 mg for a week , taper by 10mg per week , to start from 3/31/24  Protonix 40mg bid   Testing Required after Discharge -   F/u CSF studies   ** Please contact your PCP to request testing orders for any of the testing recommended here **  Important follow up information -   F/u pcp , neurologist , MS specialist , Antoine Ohara ,,ophthalmology   Other Instructions -   Rt eye patching   Driving restriction until cleared by ophthalmology or neurologist   Please review this entire after visit summary as additional general instructions including medication list, appointments, activity, diet, any pertinent wound care, and other additional recommendations from your care team that may be provided for you.      Sincerely,     Chelle King MD

## 2024-03-30 ENCOUNTER — HOSPITAL ENCOUNTER (OUTPATIENT)
Dept: INFUSION CENTER | Facility: HOSPITAL | Age: 44
Discharge: HOME | End: 2024-03-30
Attending: INTERNAL MEDICINE
Payer: COMMERCIAL

## 2024-03-30 VITALS
HEART RATE: 48 BPM | TEMPERATURE: 97.8 F | RESPIRATION RATE: 18 BRPM | DIASTOLIC BLOOD PRESSURE: 74 MMHG | SYSTOLIC BLOOD PRESSURE: 144 MMHG

## 2024-03-30 DIAGNOSIS — R90.89 ABNORMAL FINDING ON MRI OF BRAIN: Primary | ICD-10-CM

## 2024-03-30 DIAGNOSIS — H53.8 BLURRED VISION, RIGHT EYE: ICD-10-CM

## 2024-03-30 DIAGNOSIS — H46.9 OPTIC NEURITIS: ICD-10-CM

## 2024-03-30 PROCEDURE — 96365 THER/PROPH/DIAG IV INF INIT: CPT

## 2024-03-30 RX ORDER — SODIUM CHLORIDE 9 MG/ML
20 INJECTION, SOLUTION INTRAVENOUS ONCE
Status: CANCELLED | OUTPATIENT
Start: 2024-03-30

## 2024-03-30 RX ORDER — SODIUM CHLORIDE 9 MG/ML
20 INJECTION, SOLUTION INTRAVENOUS ONCE
Status: COMPLETED | OUTPATIENT
Start: 2024-03-30 | End: 2024-03-30

## 2024-03-30 RX ADMIN — SODIUM CHLORIDE 20 ML/HR: 0.9 INJECTION, SOLUTION INTRAVENOUS at 12:06

## 2024-03-30 RX ADMIN — SODIUM CHLORIDE 1000 MG: 0.9 INJECTION, SOLUTION INTRAVENOUS at 12:07

## 2024-03-30 NOTE — PLAN OF CARE
Problem: Potential for Falls  Goal: Patient will remain free of falls  Description: INTERVENTIONS:  - Educate patient/family on patient safety including physical limitations  - Instruct patient to call for assistance with activity   - Consult OT/PT to assist with strengthening/mobility   - Keep Call bell within reach  - Keep bed low and locked with side rails adjusted as appropriate  - Keep care items and personal belongings within reach  - Initiate and maintain comfort rounds  Outcome: Progressing

## 2024-03-30 NOTE — PROGRESS NOTES
Mirta Lancaster  tolerated treatment well with no complications.      Mirta Lancaster has no further infusion appts     AVS printed and given to Mirta Lancaster:  No (Declined by Mirta Lancaster)

## 2024-03-31 LAB
BACTERIA CSF CULT: ABNORMAL
GRAM STN SPEC: ABNORMAL

## 2024-04-01 ENCOUNTER — TELEPHONE (OUTPATIENT)
Dept: NEUROLOGY | Facility: CLINIC | Age: 44
End: 2024-04-01

## 2024-04-01 ENCOUNTER — TRANSITIONAL CARE MANAGEMENT (OUTPATIENT)
Dept: FAMILY MEDICINE CLINIC | Facility: CLINIC | Age: 44
End: 2024-04-01

## 2024-04-01 LAB — ACE CSF-CCNC: <1.5 U/L (ref 0–2.5)

## 2024-04-01 RX ORDER — OXYCODONE HYDROCHLORIDE 5 MG/1
5 TABLET ORAL EVERY 6 HOURS PRN
Qty: 15 TABLET | Refills: 0 | Status: SHIPPED | OUTPATIENT
Start: 2024-04-01

## 2024-04-01 NOTE — TELEPHONE ENCOUNTER
HFU/SLE/Optic Neuritis/ DC date 3/29/24 to Home     Neurology Consult Note:  Mirta Lancaster will need follow up in in 4 weeks with multiple sclerosis attending. She will not require outpatient neurological testing.      MD Chan Reddy MD; Lisa Toscano; Martha Corbett; Radha Rodriguez-  I reviewed patient case and I will be happy to see her for HFU anytime this-next week.      Scheduled for 4/8/24/[facility] /8 am

## 2024-04-01 NOTE — UTILIZATION REVIEW
NOTIFICATION OF ADMISSION DISCHARGE   This is a Notification of Discharge from [facility]. Please be advised that this patient has been discharge from our facility. Below you will find the admission and discharge date and time including the patient’s disposition.   UTILIZATION REVIEW CONTACT:  Vilma Nicholson  Utilization   Network Utilization Review Department  Phone: [phone] x carefully listen to the prompts. All voicemails are confidential.  Email: [email or web address]     ADMISSION INFORMATION  PRESENTATION DATE: 3/26/2024 12:49 PM  OBERVATION ADMISSION DATE:   INPATIENT ADMISSION DATE: 3/26/24  5:49 PM   DISCHARGE DATE: 3/29/2024  5:17 PM   DISPOSITION:Home/Self Care    Network Utilization Review Department  ATTENTION: Please call with any questions or concerns to [phone] and carefully listen to the prompts so that you are directed to the right person. All voicemails are confidential.   For Discharge needs, contact Care Management DC Support Team at [phone] opt. 2  Send all requests for admission clinical reviews, approved or denied determinations and any other requests to dedicated fax number below belonging to the campus where the patient is receiving treatment. List of dedicated fax numbers for the Facilities:  FACILITY NAME UR FAX NUMBER   ADMISSION DENIALS (Administrative/Medical Necessity) [phone]   DISCHARGE SUPPORT TEAM ([facility]) [phone]   PARENT CHILD HEALTH (Maternity/NICU/Pediatrics) [phone]   [facility] [phone]   [facility] [phone]   [facility] [phone]   [facility] [phone]   [facility] [phone]   [facility] [phone]   [facility] [phone]   [facility]  [phone]   [facility] [phone]   [facility] [phone]   [facility] [phone]   [facility] [phone]

## 2024-04-01 NOTE — TELEPHONE ENCOUNTER
Called pt and LMOM asking her to call back to make a HFU per Dr. Pedraza.       MD Chan Reddy MD; Lisa Toscano; Martha Corbett; Radha Rodriguez-  I reviewed patient case and I will be happy to see her for HFU anytime this-next week.    Radha - please help scheduling the patient - 60 min OVL/HFU advised.    Thank you,  Katey

## 2024-04-03 LAB
ALB CSF/SERPL: 4 {RATIO} (ref 0–8)
ALBUMIN CSF-MCNC: 17 MG/DL (ref 8–37)
ALBUMIN SERPL-MCNC: 4.5 G/DL (ref 3.9–4.9)
B BURGDOR DNA SPEC QL NAA+PROBE: NEGATIVE
IGG CSF-MCNC: 2.7 MG/DL (ref 0–6.7)
IGG SERPL-MCNC: 841 MG/DL (ref 586–1602)
IGG SYNTH RATE SER+CSF CALC-MRATE: 3.1 MG/DAY
IGG/ALB CLEAR SER+CSF-RTO: 0.8 (ref 0–0.7)
IGG/ALB CSF: 0.16 {RATIO} (ref 0–0.25)
MBP CSF-MCNC: 8.1 NG/ML (ref 0–3.7)
OLIGOCLONAL BANDS.IT SER+CSF QL: ABNORMAL

## 2024-04-04 LAB
ANA ELISA RESULT: NORMAL RATIO
ANTINUCLEAR ANTIBODY BY ELISA: NORMAL
Lab: NORMAL
RATIO: 0.1

## 2024-04-05 LAB — MISCELLANEOUS LAB TEST RESULT: NORMAL

## 2024-04-08 ENCOUNTER — PATIENT MESSAGE (OUTPATIENT)
Dept: NEUROLOGY | Facility: CLINIC | Age: 44
End: 2024-04-08

## 2024-04-08 ENCOUNTER — TELEPHONE (OUTPATIENT)
Dept: NEUROLOGY | Facility: CLINIC | Age: 44
End: 2024-04-08

## 2024-04-08 ENCOUNTER — OFFICE VISIT (OUTPATIENT)
Dept: NEUROLOGY | Facility: CLINIC | Age: 44
End: 2024-04-08
Payer: COMMERCIAL

## 2024-04-08 VITALS
OXYGEN SATURATION: 98 % | DIASTOLIC BLOOD PRESSURE: 72 MMHG | BODY MASS INDEX: 28.36 KG/M2 | SYSTOLIC BLOOD PRESSURE: 112 MMHG | RESPIRATION RATE: 16 BRPM | TEMPERATURE: 97.8 F | WEIGHT: 180.7 LBS | HEART RATE: 74 BPM | HEIGHT: 67 IN

## 2024-04-08 DIAGNOSIS — G95.0 SYRINX OF SPINAL CORD (HCC): ICD-10-CM

## 2024-04-08 DIAGNOSIS — H46.9 RIGHT OPTIC NEURITIS: Primary | ICD-10-CM

## 2024-04-08 DIAGNOSIS — G37.9 CNS DEMYELINATING DISEASE (HCC): ICD-10-CM

## 2024-04-08 DIAGNOSIS — M50.30 DISC DISEASE, DEGENERATIVE, CERVICAL: ICD-10-CM

## 2024-04-08 PROCEDURE — 99215 OFFICE O/P EST HI 40 MIN: CPT | Performed by: PSYCHIATRY & NEUROLOGY

## 2024-04-08 NOTE — TELEPHONE ENCOUNTER
Mirta SANTOYO [facility] [place] [facility] (supporting You)       4/8/24 11:33 AM  Thank you for your help. My jury sequence # [id number], the phone number provided for the [facility] [address] Administration Office is [phone]. If you please fax a copy to the their office and also one in [employer], I would greatly appreciate it.

## 2024-04-08 NOTE — TELEPHONE ENCOUNTER
April 8, 2024  Me   to BelleSarai Lancaster CB      4/8/24 11:10 AM  Thank you for contacting [facility],     Hi Mirta,     Dr. Pedraza has requested that we write a jury excuse letter on your behalf. We will need your juror # and well as the phone number for the District Court that issued the jury summons. Please also let us know if you would like the letter sent to your [employer] portal or if you would like us to fax it for you, or both.      Thank you!     Jeaneth TURNER RN        Thank you for choosing [facility] for your healthcare needs!

## 2024-04-08 NOTE — PROGRESS NOTES
"Patient ID: Mirta Lancaster is a 43 y.o. female.    Assessment/Plan:           Problem List Items Addressed This Visit          Nervous and Auditory    Right optic neuritis - Primary    Relevant Orders    Vitamin D 25 hydroxy    Vitamin B12    Cardiolipin antibody    Beta-2 glycoprotein antibodies    Lupus anticoagulant    MRI brain and orbits wo and w contrast    Syrinx of spinal cord (HCC)    CNS demyelinating disease (HCC)    Relevant Orders    Vitamin D 25 hydroxy    Vitamin B12    Cardiolipin antibody    Beta-2 glycoprotein antibodies    Lupus anticoagulant    MRI brain and orbits wo and w contrast       Musculoskeletal and Integument    Disc disease, degenerative, cervical        Mrs. Lancaster has presented to [facility] multiple sclerosis center for follow-up on right-sided optic neuritis.     Patient acutely developed optic neuritis in her right eye around March 23, 2024 with no signs of infection/inflammation took place prior to this event.  Patient believes she had significant improvement in her right eye function with no signs of 'Black line\" of scotoma in central vision been reported, at the same time patient believes she is still see fuzzy.  Neurology exam was not consistent with APD and she had normal Ishihara color vision test but abnormal red desaturation OD.     Patient completed comprehensive evaluation with NMO/MOG results came back negative;  SHOLA/RF screening/Lyme/Sjogren's antibodies within normal range.  CSF analysis included MS panel patient had 0 oligoclonal bands and mildly elevated IgG index at 0.8.  Patient also completed interleukin-2 receptor soluble CSF which comes back normal-test was performed to evaluate for immune markers of sarcoidosis.     CSF culture was positive for gram-positive cocci on 329, 2024 and final call was growth in broth culture only Streptococcus mitis oralis group-inpatient neurology discussed case with infectious disease and due to lack of signs of " infection/inflammation, felt to be less likely meningitis/encephalitis.  CSF WBC 9 with no cells seen on CSF differential; CSF protein 33 mg/dL.    Serum ESR negative as well.    Patient has no evidence of demyelination in thoracic spine with questionable tiny syrinx and degenerative disc disease.  Cervical spine was also consistent with tiny syrinx at the C5-C6 with no cord compression but no evidence of MRI examination.  Patient has multilevel degenerative disc disease at C5-C6.    Patient describes no history of focal neurological deficit with no signs of sensorimotor dysfunction, balance issues or bladder/bowel dysfunction.    Patient does not have signs of GI dysfunction either-patient has healthy lifestyle with regular physical activity, patient is a mother of 4 kids with no post partum neurological deficit described.    We personally reviewed patient brain MRI, we agreed patient has several small periventricular white matter changes without sharp borders without significant T2 hyperintensity in the left periventricular region with no corresponding corpus callosum lesion or T1 weighted black holes. Optic Neuritis Treatment Trial (ONTT) was briefly discussed during this visit and despite trial was conducted in 1723-2996, we still offer understanding what patient may or may not develop MS within 10-15 years. Patient does have a small risk of developing MS between 25 to 50%, but with negative markers of inflammation on CSF analysis risk is minimal taking to consideration of the patient age as well.     We very briefly discussed potential treatment options if we need-glatiramer acetate would be the only treatment I may offer with minimal side effect profile.     Patient will be advised to complete serologic workup with markers of antiphospholipid syndrome as well as vitamin B12 and vitamin D levels.  Patient will be offered repeating brain MRI/orbits within 6 months and then we will proceed to annual brain  imaging.    Patient is to reach our practice anytime she believes she has any new focal neurological deficit.    We also discussed optic neuritis could be due to postviral or idiopathic is no signs of toxic optic neuropathy or occult infection or vasculitis been appreciated.        Subjective:right sided vision dysfunction    HPI    Mrs. Lancaster is a 42 yo female who presented to [facility] for evaluation.  Patient was hospitalized on March 26, 2024 as she had 3 days of painless right thigh monocular blurry vision.  At that time patient described that clinical presentation was similar to her previous migrainous visual aura but this time symptoms lasted longer.  Patient was evaluated in the hospital including ocular POCUS, as per the chart review, with no pathology has been appreciated.  Imaging at the same time were consistent with prechiasmatic and intracanalicular edema and enhancement consistent with optic neuritis in addition to right occipital horn periventricular FLAIR And punctate enhancing lesion adjacent to the left occipital horn.  Patient was offered to start IV methylprednisolone 1 g daily x 5 days (pending clinical course and results of further workup can consider transitioning the last few doses to either outpatient infusion center or oral prednisone equivalent)  Patient was advised to prednisone 60 mg daily with taper of 10 mg/week;    Patient continued describing right IAC black and fuzzy but less black line across her visual fields on diagonal been noted recently.  No clear central scotoma or peripheral vision loss been described otherwise.    -MRI cervical and thoracic spine with and without contrast  -IR lumbar puncture              -Protein, cell count with differential, glucose  -MS panel (oligoclonal bands in serum and CSF, IgG index)  -CSF ACE  -[facility] sendout for CSF soluble IL-2 receptor (Interleukin 2 Receptor, Soluble, CSF  [facility] Test Directory )  -CSF Lyme  -Serum CNS  demyelinating disease evaluation profile  -Serum Lyme, SHOLA, RF, CCP, PANTERA panel, SSA/SSB  -Outpatient neuro immunology follow-up    The following portions of the patient's history were reviewed and updated as appropriate: She  has a past medical history of GERD (gastroesophageal reflux disease), Headache, Hypotension, Irregular heart beat, and Migraines.  She   Patient Active Problem List    Diagnosis Date Noted    Disc disease, degenerative, cervical 2024    Syrinx of spinal cord (HCC) 2024    CNS demyelinating disease (HCC) 2024    Abnormal finding on MRI of brain 2024    Blurred vision, right eye 2024    Leucocytosis 2024    Post lumbar puncture headache 2024    Right optic neuritis 2024    GERD (gastroesophageal reflux disease) 2024    At increased risk of breast cancer 10/12/2022    Dense breast tissue 2021    Screening mammogram for high-risk patient 10/26/2020    Atypical lobular hyperplasia of left breast 2020    Ocular migraine 2012     She  has a past surgical history that includes Knee arthroscopy w/ ACL reconstruction; Appendectomy; Tonsillectomy; US guided breast biopsy left complete (Left, 2020); EGD; [place] tooth extraction; Dorsal compartment release (Bilateral);  section; Cervical biopsy w/ loop electrode excision; Chest wall biopsy (Left, 3/13/2020); US guided breast biopsy right complete (Right, 10/30/2020); Breast cyst excision (Left); Breast biopsy (Left, 12 years ago); and IR lumbar puncture (3/27/2024).  Her family history includes Breast cancer in her paternal aunt; Lung cancer (age of onset: 70) in her maternal grandfather; No Known Problems in her brother, daughter, daughter, father, maternal grandmother, mother, paternal aunt, paternal aunt, paternal grandfather, paternal grandmother, son, and son; Prostate cancer (age of onset: 70) in her maternal grandfather.  She  reports that she has never smoked. She  has never used smokeless tobacco. She reports current alcohol use. She reports that she does not use drugs.  Current Outpatient Medications   Medication Sig Dispense Refill    Multiple Vitamin (MULTIVITAMIN) tablet Take 1 tablet by mouth daily      pantoprazole (PROTONIX) 40 mg tablet Take 1 tablet (40 mg total) by mouth 2 (two) times a day before meals 60 tablet 0    predniSONE 20 mg tablet Take 3 tablets (60 mg total) by mouth daily for 7 days, THEN 2.5 tablets (50 mg total) daily for 7 days, THEN 2 tablets (40 mg total) daily for 7 days, THEN 1.5 tablets (30 mg total) daily for 7 days, THEN 1 tablet (20 mg total) daily for 7 days, THEN 0.5 tablets (10 mg total) daily for 7 days. To start po prednisone 60mg from 3/31/24. 74 tablet 0    oxyCODONE (Roxicodone) 5 immediate release tablet Take 1 tablet (5 mg total) by mouth every 6 (six) hours as needed for moderate pain for up to 15 doses Max Daily Amount: 20 mg (Patient not taking: Reported on 4/8/2024) 15 tablet 0     No current facility-administered medications for this visit.     Current Outpatient Medications on File Prior to Visit   Medication Sig    Multiple Vitamin (MULTIVITAMIN) tablet Take 1 tablet by mouth daily    pantoprazole (PROTONIX) 40 mg tablet Take 1 tablet (40 mg total) by mouth 2 (two) times a day before meals    predniSONE 20 mg tablet Take 3 tablets (60 mg total) by mouth daily for 7 days, THEN 2.5 tablets (50 mg total) daily for 7 days, THEN 2 tablets (40 mg total) daily for 7 days, THEN 1.5 tablets (30 mg total) daily for 7 days, THEN 1 tablet (20 mg total) daily for 7 days, THEN 0.5 tablets (10 mg total) daily for 7 days. To start po prednisone 60mg from 3/31/24.    oxyCODONE (Roxicodone) 5 immediate release tablet Take 1 tablet (5 mg total) by mouth every 6 (six) hours as needed for moderate pain for up to 15 doses Max Daily Amount: 20 mg (Patient not taking: Reported on 4/8/2024)     No current facility-administered medications on file  "prior to visit.     She has No Known Allergies..         Objective:    Blood pressure 112/72, pulse 74, temperature 97.8 °F (36.6 °C), temperature source Temporal, resp. rate 16, height 5' 7\" (1.702 m), weight 82 kg (180 lb 11.2 oz), last menstrual period 03/08/2024, SpO2 98%, not currently breastfeeding.    Physical Exam    Neurological Exam  CONSTITUTIONAL: NAD, pleasant. NECK: supple, no lymphadenopathy, no thyromegaly, no JVD. CARDIOVASCULAR: RRR, normal S1S2, no murmurs, no rubs. RESP: clear to auscultation bilaterally, no wheezes/rhonchi/rales. ABDOMEN: soft, non tender, non distended. SKIN: no rash or skin lesions. EXTREMITIES: no edema, pulses 2+bilaterally. PSYCH: appropriate mood and affect  NEUROLOGIC COMPREHENSIVE EXAM: Patient is oriented to person, place and time, NAD; appropriate affect. CN II, III, IV, V, VI, VII,VIII,IX,X,XI-XII intact with EOMI, PERRLA, OKN intact, VF grossly intact, fundi poorly visualized secondary to pupillary constriction; symmetric face noted. Motor: 5/5 UE/LE bilateral symmetric; Sensory: intact to light touch and pinprick bilaterally; normal vibration sensation feet bilaterally; Coordination within normal limits on FTN and NA testing; DTR: 2/4 through, no Babinski, no clonus. Tandem gait is intact. Romberg: absent.    ROS:    Review of Systems   Constitutional:  Negative for appetite change, fatigue and fever.   HENT: Negative.  Negative for hearing loss, tinnitus, trouble swallowing and voice change.    Eyes:  Positive for visual disturbance. Negative for photophobia and pain.   Respiratory: Negative.  Negative for shortness of breath.    Cardiovascular: Negative.  Negative for palpitations.   Gastrointestinal: Negative.  Negative for nausea and vomiting.   Endocrine: Negative.  Negative for cold intolerance.   Genitourinary: Negative.  Negative for dysuria, frequency and urgency.   Musculoskeletal:  Negative for back pain, gait problem, myalgias, neck pain and neck " stiffness.   Skin: Negative.  Negative for rash.   Allergic/Immunologic: Negative.    Neurological: Negative.  Negative for dizziness, tremors, seizures, syncope, facial asymmetry, speech difficulty, weakness, light-headedness, numbness and headaches.   Hematological: Negative.  Does not bruise/bleed easily.   Psychiatric/Behavioral: Negative.  Negative for confusion, hallucinations and sleep disturbance.    All other systems reviewed and are negative.

## 2024-04-08 NOTE — TELEPHONE ENCOUNTER
Please help the patient with a Letter excusing her from a Jury duty due to acute Neurologic condition with a visual dysfunction and current immunocompromised state.

## 2024-04-08 NOTE — TELEPHONE ENCOUNTER
Jury excuse letter generated and sent to pt via [email or web address].     Letter faxed to [facility] at:    [phone] (f)    [email or web address] msg sent to pt.

## 2024-04-10 ENCOUNTER — HOSPITAL ENCOUNTER (OUTPATIENT)
Dept: MAMMOGRAPHY | Facility: HOSPITAL | Age: 44
Discharge: HOME | End: 2024-04-10
Payer: COMMERCIAL

## 2024-04-10 VITALS — HEIGHT: 67 IN | BODY MASS INDEX: 28.25 KG/M2 | WEIGHT: 180 LBS

## 2024-04-10 DIAGNOSIS — Z12.31 VISIT FOR SCREENING MAMMOGRAM: ICD-10-CM

## 2024-04-10 PROCEDURE — 77063 BREAST TOMOSYNTHESIS BI: CPT

## 2024-04-10 PROCEDURE — 77067 SCR MAMMO BI INCL CAD: CPT

## 2024-04-26 ENCOUNTER — APPOINTMENT (OUTPATIENT)
Dept: LAB | Facility: HOSPITAL | Age: 44
End: 2024-04-26
Payer: COMMERCIAL

## 2024-04-26 DIAGNOSIS — G37.9 CNS DEMYELINATING DISEASE (HCC): ICD-10-CM

## 2024-04-26 DIAGNOSIS — H46.9 RIGHT OPTIC NEURITIS: ICD-10-CM

## 2024-04-26 LAB
25(OH)D3 SERPL-MCNC: 28.4 NG/ML (ref 30–100)
B2 GLYCOPROT1 IGA SERPL IA-ACNC: 0.5
B2 GLYCOPROT1 IGG SERPL IA-ACNC: <0.8
B2 GLYCOPROT1 IGM SERPL IA-ACNC: <2.4
CARDIOLIPIN IGA SER IA-ACNC: 0.6
CARDIOLIPIN IGG SER IA-ACNC: <0.5
CARDIOLIPIN IGM SER IA-ACNC: 2.3
VIT B12 SERPL-MCNC: 492 PG/ML (ref 180–914)

## 2024-04-26 PROCEDURE — 85705 THROMBOPLASTIN INHIBITION: CPT

## 2024-04-26 PROCEDURE — 82607 VITAMIN B-12: CPT

## 2024-04-26 PROCEDURE — 86146 BETA-2 GLYCOPROTEIN ANTIBODY: CPT

## 2024-04-26 PROCEDURE — 82306 VITAMIN D 25 HYDROXY: CPT

## 2024-04-26 PROCEDURE — 85732 THROMBOPLASTIN TIME PARTIAL: CPT

## 2024-04-26 PROCEDURE — 86147 CARDIOLIPIN ANTIBODY EA IG: CPT

## 2024-04-26 PROCEDURE — 85670 THROMBIN TIME PLASMA: CPT

## 2024-04-26 PROCEDURE — 36415 COLL VENOUS BLD VENIPUNCTURE: CPT

## 2024-04-26 PROCEDURE — 85613 RUSSELL VIPER VENOM DILUTED: CPT

## 2024-04-29 ENCOUNTER — TELEPHONE (OUTPATIENT)
Dept: NEUROLOGY | Facility: CLINIC | Age: 44
End: 2024-04-29

## 2024-04-29 LAB
APTT SCREEN TO CONFIRM RATIO: 1.15 RATIO (ref 0–1.34)
CONFIRM APTT/NORMAL: 41.9 SEC (ref 0–47.6)
LA PPP-IMP: NORMAL
SCREEN APTT: 27.2 SEC (ref 0–43.5)
SCREEN DRVVT: 32.4 SEC (ref 0–47)
THROMBIN TIME: 19.1 SEC (ref 0–23)

## 2024-04-29 NOTE — TELEPHONE ENCOUNTER
----- Message from Katey Pedraza MD sent at 4/26/2024  3:00 PM EDT -----  Please call the patient regarding her abnormal result.Low Vitamin D3 - OTC vitamin D3 2000 IU daily

## 2024-05-22 ENCOUNTER — TELEPHONE (OUTPATIENT)
Age: 44
End: 2024-05-22

## 2024-05-22 NOTE — TELEPHONE ENCOUNTER
Patient needs to reschedule hep b nurse visit for this Friday. Pod cannot schedule unless order placed in chart. Please call patient to schedule hep b for Friday.

## 2024-05-24 ENCOUNTER — CLINICAL SUPPORT (OUTPATIENT)
Dept: FAMILY MEDICINE CLINIC | Facility: CLINIC | Age: 44
End: 2024-05-24
Payer: COMMERCIAL

## 2024-05-24 DIAGNOSIS — Z23 ENCOUNTER FOR IMMUNIZATION: Primary | ICD-10-CM

## 2024-05-24 PROCEDURE — G0010 ADMIN HEPATITIS B VACCINE: HCPCS

## 2024-05-24 PROCEDURE — 90746 HEPB VACCINE 3 DOSE ADULT IM: CPT

## 2024-10-08 ENCOUNTER — HOSPITAL ENCOUNTER (OUTPATIENT)
Dept: MRI IMAGING | Facility: HOSPITAL | Age: 44
Discharge: HOME | End: 2024-10-08
Attending: PSYCHIATRY & NEUROLOGY
Payer: COMMERCIAL

## 2024-10-08 DIAGNOSIS — G37.9 CNS DEMYELINATING DISEASE (HCC): ICD-10-CM

## 2024-10-08 DIAGNOSIS — H46.9 RIGHT OPTIC NEURITIS: ICD-10-CM

## 2024-10-08 PROCEDURE — A9585 GADOBUTROL INJECTION: HCPCS | Performed by: PSYCHIATRY & NEUROLOGY

## 2024-10-08 PROCEDURE — 70543 MRI ORBT/FAC/NCK W/O &W/DYE: CPT

## 2024-10-08 PROCEDURE — 70553 MRI BRAIN STEM W/O & W/DYE: CPT

## 2024-10-08 RX ORDER — GADOBUTROL 604.72 MG/ML
8 INJECTION INTRAVENOUS
Status: COMPLETED | OUTPATIENT
Start: 2024-10-08 | End: 2024-10-08

## 2024-10-08 RX ADMIN — GADOBUTROL 8 ML: 604.72 INJECTION INTRAVENOUS at 09:16

## 2024-11-19 ENCOUNTER — HOSPITAL ENCOUNTER (OUTPATIENT)
Dept: MRI IMAGING | Facility: HOSPITAL | Age: 44
Discharge: HOME | End: 2024-11-19
Payer: COMMERCIAL

## 2024-11-19 DIAGNOSIS — N60.92 ATYPICAL LOBULAR HYPERPLASIA OF LEFT BREAST: ICD-10-CM

## 2024-11-19 DIAGNOSIS — Z91.89 AT INCREASED RISK OF BREAST CANCER: ICD-10-CM

## 2024-11-19 PROCEDURE — G1004 CDSM NDSC: HCPCS

## 2024-11-19 PROCEDURE — C8908 MRI W/O FOL W/CONT, BREAST,: HCPCS

## 2024-11-19 PROCEDURE — 77049 MRI BREAST C-+ W/CAD BI: CPT

## 2024-11-19 PROCEDURE — A9585 GADOBUTROL INJECTION: HCPCS

## 2024-11-19 PROCEDURE — C8937 CAD BREAST MRI: HCPCS

## 2024-11-19 RX ORDER — GADOBUTROL 604.72 MG/ML
8 INJECTION INTRAVENOUS
Status: COMPLETED | OUTPATIENT
Start: 2024-11-19 | End: 2024-11-19

## 2024-11-19 RX ADMIN — GADOBUTROL 8 ML: 604.72 INJECTION INTRAVENOUS at 09:09

## 2024-11-25 ENCOUNTER — OFFICE VISIT (OUTPATIENT)
Dept: SURGICAL ONCOLOGY | Facility: CLINIC | Age: 44
End: 2024-11-25
Payer: COMMERCIAL

## 2024-11-25 VITALS
HEIGHT: 67 IN | OXYGEN SATURATION: 99 % | SYSTOLIC BLOOD PRESSURE: 116 MMHG | HEART RATE: 77 BPM | TEMPERATURE: 97.3 F | WEIGHT: 198 LBS | BODY MASS INDEX: 31.08 KG/M2 | DIASTOLIC BLOOD PRESSURE: 70 MMHG

## 2024-11-25 DIAGNOSIS — Z12.39 BREAST CANCER SCREENING, HIGH RISK PATIENT: ICD-10-CM

## 2024-11-25 DIAGNOSIS — Z91.89 AT INCREASED RISK OF BREAST CANCER: ICD-10-CM

## 2024-11-25 DIAGNOSIS — N60.92 ATYPICAL LOBULAR HYPERPLASIA OF LEFT BREAST: Primary | ICD-10-CM

## 2024-11-25 DIAGNOSIS — Z12.31 BREAST CANCER SCREENING BY MAMMOGRAM: ICD-10-CM

## 2024-11-25 PROCEDURE — 99213 OFFICE O/P EST LOW 20 MIN: CPT

## 2024-11-25 NOTE — PROGRESS NOTES
Name: Mirta Lancaster      : 1980      MRN: [id number]  Encounter Provider: BAHMAN Angel  Encounter Date: 2024   Encounter department: [facility]     :  Assessment & Plan  Atypical lobular hyperplasia of left breast  - 1 year follow up  Orders:    Mammo screening bilateral w 3d and cad; Future    At increased risk of breast cancer    Orders:    MRI breast bilateral w and wo contrast w cad; Future    Mammo screening bilateral w 3d and cad; Future    Breast cancer screening by mammogram    Orders:    Mammo screening bilateral w 3d and cad; Future    Breast cancer screening, high risk patient    Orders:    MRI breast bilateral w and wo contrast w cad; Future        History of Present Illness     Chief Complaint   Patient presents with    Follow-up     Patient is a 44-year-old female that was diagnosed with atypical lobular hyperplasia (ALH) in . This was found incidentally with excisional biopsy. We have been following her with alternating annual breast MRI and annual 3D mammography. She had a bilateral screening mammogram on 4/10/2024 which was BI-RADS 2 category 3 density. She recently had a bilateral breast MRI on 2024 which was benign. We will continue to see her annually. There were no concerns on her cbe. She denies changes in family hx or breast concerns today. I will see the patient back in 1 year or sooner should the need arise. She was instructed to call with any questions or concerns prior to this time. All questions were answered today.     Return in about 1 year (around 2025) for Follow up with BAHMAN So.      Review of Systems   Constitutional:  Negative for activity change, appetite change, fatigue, fever and unexpected weight change.   Respiratory:  Negative for cough and shortness of breath.    Cardiovascular:  Negative for chest pain.   Gastrointestinal:  Negative for abdominal pain, diarrhea, nausea and  "vomiting.   Endocrine: Negative for heat intolerance.   Musculoskeletal:  Negative for arthralgias, back pain and myalgias.   Skin: Negative.  Negative for rash.   Neurological: Negative.  Negative for weakness and headaches.   Hematological:  Negative for adenopathy.   Psychiatric/Behavioral:  Negative for confusion.     A complete review of systems is negative other than that noted above in the HPI.         Current Outpatient Medications   Medication Sig Dispense Refill    Multiple Vitamin (MULTIVITAMIN) tablet Take 1 tablet by mouth daily      oxyCODONE (Roxicodone) 5 immediate release tablet Take 1 tablet (5 mg total) by mouth every 6 (six) hours as needed for moderate pain for up to 15 doses Max Daily Amount: 20 mg (Patient not taking: Reported on 4/8/2024) 15 tablet 0    pantoprazole (PROTONIX) 40 mg tablet Take 1 tablet (40 mg total) by mouth 2 (two) times a day before meals (Patient not taking: Reported on 11/25/2024) 60 tablet 0     No current facility-administered medications for this visit.     Objective   /70   Pulse 77   Temp (!) 97.3 °F (36.3 °C)   Ht 5' 7\" (1.702 m)   Wt 89.8 kg (198 lb)   LMP 10/30/2024 (Exact Date)   SpO2 99%   BMI 31.01 kg/m²     Vitals:    11/25/24 1506   BP: 116/70   Pulse: 77   Temp: (!) 97.3 °F (36.3 °C)   SpO2: 99%     ECOG    Physical Exam  Vitals and nursing note reviewed.   Constitutional:       Appearance: Normal appearance. She is normal weight.   Eyes:      General: No scleral icterus.     Conjunctiva/sclera: Conjunctivae normal.   Chest:      Chest wall: No mass.   Breasts:     Right: No swelling, bleeding, inverted nipple, mass, nipple discharge, skin change or tenderness.      Left: Skin change (surgical scar s/p excisional biopsy) present. No swelling, bleeding, inverted nipple, mass, nipple discharge or tenderness.       Lymphadenopathy:      Upper Body:      Right upper body: No supraclavicular or axillary adenopathy.      Left upper body: No " supraclavicular or axillary adenopathy.   Skin:     General: Skin is warm and dry.   Neurological:      General: No focal deficit present.      Mental Status: She is alert and oriented to person, place, and time. Mental status is at baseline.   Psychiatric:         Mood and Affect: Mood normal.         Behavior: Behavior normal.         Thought Content: Thought content normal.         Judgment: Judgment normal.        Constitutional: General appearance: The Patient is well-developed and well-nourished who appears the stated age in no acute distress. Patient is pleasant and talkative.  HEENT: Normocephalic. Sclerae are anicteric. Mucous membranes are moist. Neck is supple without adenopathy. No JVD.  Chest: The lungs are clear to auscultation.  Cardiac: Heart is regular rate.  Abdomen: Abdomen is soft, non-tender, non-distended and without masses.  Extremities: There is no clubbing or cyanosis. There is no edema. Symmetric.  Neuro: Grossly nonfocal. Gait is normal.  Lymphatic: No evidence of cervical adenopathy bilaterally.  No evidence of axillary adenopathy bilaterally. No evidence of inguinal adenopathy bilaterally.  Skin: Warm, anicteric.  Psych: Patient is pleasant and talkative    Labs: I have reviewed pertinent labs.       Pathology: I have reviewed pathology reports described above.    Administrative Statements   I have spent a total time of 20 minutes in caring for this patient on the day of the visit/encounter including Diagnostic results, Counseling / Coordination of care, Documenting in the medical record, Reviewing / ordering tests, medicine, procedures  , and Obtaining or reviewing history  .

## 2024-11-25 NOTE — ASSESSMENT & PLAN NOTE
Orders:    MRI breast bilateral w and wo contrast w cad; Future    Mammo screening bilateral w 3d and cad; Future

## 2025-04-11 ENCOUNTER — HOSPITAL ENCOUNTER (OUTPATIENT)
Facility: HOSPITAL | Age: 45
End: 2025-04-11
Payer: COMMERCIAL

## 2025-04-11 VITALS — BODY MASS INDEX: 31.08 KG/M2 | HEIGHT: 67 IN | WEIGHT: 198 LBS

## 2025-04-11 DIAGNOSIS — Z12.31 BREAST CANCER SCREENING BY MAMMOGRAM: ICD-10-CM

## 2025-04-11 DIAGNOSIS — Z91.89 AT INCREASED RISK OF BREAST CANCER: ICD-10-CM

## 2025-04-11 DIAGNOSIS — N60.92 ATYPICAL LOBULAR HYPERPLASIA OF LEFT BREAST: ICD-10-CM

## 2025-04-11 PROCEDURE — 77063 BREAST TOMOSYNTHESIS BI: CPT

## 2025-04-11 PROCEDURE — 77067 SCR MAMMO BI INCL CAD: CPT

## 2025-04-17 ENCOUNTER — TELEPHONE (OUTPATIENT)
Dept: SURGICAL ONCOLOGY | Facility: CLINIC | Age: 45
End: 2025-04-17

## 2025-04-17 NOTE — TELEPHONE ENCOUNTER
Spoke to patient and gave her Sonia Muniz message that results should be back soon.  Patient was concerned something was wrong that was why no  results at this time.

## 2025-06-25 ENCOUNTER — OFFICE VISIT (OUTPATIENT)
Dept: FAMILY MEDICINE CLINIC | Facility: CLINIC | Age: 45
End: 2025-06-25
Payer: COMMERCIAL

## 2025-06-25 VITALS
OXYGEN SATURATION: 99 % | SYSTOLIC BLOOD PRESSURE: 120 MMHG | HEART RATE: 86 BPM | HEIGHT: 67 IN | TEMPERATURE: 97 F | DIASTOLIC BLOOD PRESSURE: 82 MMHG | BODY MASS INDEX: 30.45 KG/M2 | WEIGHT: 194 LBS

## 2025-06-25 DIAGNOSIS — E55.9 VITAMIN D DEFICIENCY: ICD-10-CM

## 2025-06-25 DIAGNOSIS — Z00.00 ANNUAL PHYSICAL EXAM: Primary | ICD-10-CM

## 2025-06-25 DIAGNOSIS — E66.9 OBESITY (BMI 30-39.9): ICD-10-CM

## 2025-06-25 DIAGNOSIS — R23.2 HOT FLASHES: ICD-10-CM

## 2025-06-25 DIAGNOSIS — Z01.419 ENCOUNTER FOR GYNECOLOGICAL EXAMINATION: ICD-10-CM

## 2025-06-25 DIAGNOSIS — Z13.220 SCREENING FOR HYPERLIPIDEMIA: ICD-10-CM

## 2025-06-25 PROCEDURE — 99396 PREV VISIT EST AGE 40-64: CPT | Performed by: FAMILY MEDICINE

## 2025-06-25 NOTE — PATIENT INSTRUCTIONS
"Patient Education     Routine physical for adults   The Basics   Written by the doctors and editors at [facility]   What is a physical? -- A physical is a routine visit, or \"check-up,\" with your doctor. You might also hear it called a \"wellness visit\" or \"preventive visit.\"  During each visit, the doctor will:   Ask about your physical and mental health   Ask about your habits, behaviors, and lifestyle   Do an exam   Give you vaccines if needed   Talk to you about any medicines you take   Give advice about your health   Answer your questions  Getting regular check-ups is an important part of taking care of your health. It can help your doctor find and treat any problems you have. But it's also important for preventing health problems.  A routine physical is different from a \"sick visit.\" A sick visit is when you see a doctor because of a health concern or problem. Since physicals are scheduled ahead of time, you can think about what you want to ask the doctor.  How often should I get a physical? -- It depends on your age and health. In general, for people age 21 years and older:   If you are younger than 50 years, you might be able to get a physical every 3 years.   If you are 50 years or older, your doctor might recommend a physical every year.  If you have an ongoing health condition, like diabetes or high blood pressure, your doctor will probably want to see you more often.  What happens during a physical? -- In general, each visit will include:   Physical exam - The doctor or nurse will check your height, weight, heart rate, and blood pressure. They will also look at your eyes and ears. They will ask about how you are feeling and whether you have any symptoms that bother you.   Medicines - It's a good idea to bring a list of all the medicines you take to each doctor visit. Your doctor will talk to you about your medicines and answer any questions. Tell them if you are having any side effects that bother you. You " "should also tell them if you are having trouble paying for any of your medicines.   Habits and behaviors - This includes:   Your diet   Your exercise habits   Whether you smoke, drink alcohol, or use drugs   Whether you are sexually active   Whether you feel safe at home  Your doctor will talk to you about things you can do to improve your health and lower your risk of health problems. They will also offer help and support. For example, if you want to quit smoking, they can give you advice and might prescribe medicines. If you want to improve your diet or get more physical activity, they can help you with this, too.   Lab tests, if needed - The tests you get will depend on your age and situation. For example, your doctor might want to check your:   Cholesterol   Blood sugar   Iron level   Vaccines - The recommended vaccines will depend on your age, health, and what vaccines you already had. Vaccines are very important because they can prevent certain serious or deadly infections.   Discussion of screening - \"Screening\" means checking for diseases or other health problems before they cause symptoms. Your doctor can recommend screening based on your age, risk, and preferences. This might include tests to check for:   Cancer, such as breast, prostate, cervical, ovarian, colorectal, prostate, lung, or skin cancer   Sexually transmitted infections, such as chlamydia and gonorrhea   Mental health conditions like depression and anxiety  Your doctor will talk to you about the different types of screening tests. They can help you decide which screenings to have. They can also explain what the results might mean.   Answering questions - The physical is a good time to ask the doctor or nurse questions about your health. If needed, they can refer you to other doctors or specialists, too.  Adults older than 65 years often need other care, too. As you get older, your doctor will talk to you about:   How to prevent falling at " home   Hearing or vision tests   Memory testing   How to take your medicines safely   Making sure that you have the help and support you need at home  All topics are updated as new evidence becomes available and our peer review process is complete.  This topic retrieved from [employer] on: May 02, 2024.  Topic 727486 Version 1.0  Release: 32.4.3 - C32.122  © 2024 UpToDate, Inc. and/or its affiliates. All rights reserved.  Consumer Information Use and Disclaimer   Disclaimer: This generalized information is a limited summary of diagnosis, treatment, and/or medication information. It is not meant to be comprehensive and should be used as a tool to help the user understand and/or assess potential diagnostic and treatment options. It does NOT include all information about conditions, treatments, medications, side effects, or risks that may apply to a specific patient. It is not intended to be medical advice or a substitute for the medical advice, diagnosis, or treatment of a health care provider based on the health care provider's examination and assessment of a patient's specific and unique circumstances. Patients must speak with a health care provider for complete information about their health, medical questions, and treatment options, including any risks or benefits regarding use of medications. This information does not endorse any treatments or medications as safe, effective, or approved for treating a specific patient. UpToDate, Inc. and its affiliates disclaim any warranty or liability relating to this information or the use thereof.The use of this information is governed by the Terms of Use, available at https://www.woltersFormat Dynamicsuwer.com/en/know/clinical-effectiveness-terms. 2024© UpToDate, Inc. and its affiliates and/or licensors. All rights reserved.  Copyright   © 2024 UpToDate, Inc. and/or its affiliates. All rights reserved.  Check labs and take Vitamin D and check labs for hx of hot flashes and obesity. Call if  any problems and see GYN as directed and get mammogram sand get colon screening when 45.

## 2025-06-25 NOTE — PROGRESS NOTES
"Adult Annual Physical  Name: Mirta Lancaster      : 1980      MRN: [id number]  Encounter Provider: Stephen Kimball DO  Encounter Date: 2025   Encounter department: [facility] PRIMARY CARE    :  Chief Complaint   Patient presents with   • Annual Exam     Patient Instructions   Patient Education     Routine physical for adults   The Basics   Written by the doctors and editors at [facility]   What is a physical? -- A physical is a routine visit, or \"check-up,\" with your doctor. You might also hear it called a \"wellness visit\" or \"preventive visit.\"  During each visit, the doctor will:   Ask about your physical and mental health   Ask about your habits, behaviors, and lifestyle   Do an exam   Give you vaccines if needed   Talk to you about any medicines you take   Give advice about your health   Answer your questions  Getting regular check-ups is an important part of taking care of your health. It can help your doctor find and treat any problems you have. But it's also important for preventing health problems.  A routine physical is different from a \"sick visit.\" A sick visit is when you see a doctor because of a health concern or problem. Since physicals are scheduled ahead of time, you can think about what you want to ask the doctor.  How often should I get a physical? -- It depends on your age and health. In general, for people age 21 years and older:   If you are younger than 50 years, you might be able to get a physical every 3 years.   If you are 50 years or older, your doctor might recommend a physical every year.  If you have an ongoing health condition, like diabetes or high blood pressure, your doctor will probably want to see you more often.  What happens during a physical? -- In general, each visit will include:   Physical exam - The doctor or nurse will check your height, weight, heart rate, and blood pressure. They will also look at your eyes and ears. They will ask about how you " "are feeling and whether you have any symptoms that bother you.   Medicines - It's a good idea to bring a list of all the medicines you take to each doctor visit. Your doctor will talk to you about your medicines and answer any questions. Tell them if you are having any side effects that bother you. You should also tell them if you are having trouble paying for any of your medicines.   Habits and behaviors - This includes:   Your diet   Your exercise habits   Whether you smoke, drink alcohol, or use drugs   Whether you are sexually active   Whether you feel safe at home  Your doctor will talk to you about things you can do to improve your health and lower your risk of health problems. They will also offer help and support. For example, if you want to quit smoking, they can give you advice and might prescribe medicines. If you want to improve your diet or get more physical activity, they can help you with this, too.   Lab tests, if needed - The tests you get will depend on your age and situation. For example, your doctor might want to check your:   Cholesterol   Blood sugar   Iron level   Vaccines - The recommended vaccines will depend on your age, health, and what vaccines you already had. Vaccines are very important because they can prevent certain serious or deadly infections.   Discussion of screening - \"Screening\" means checking for diseases or other health problems before they cause symptoms. Your doctor can recommend screening based on your age, risk, and preferences. This might include tests to check for:   Cancer, such as breast, prostate, cervical, ovarian, colorectal, prostate, lung, or skin cancer   Sexually transmitted infections, such as chlamydia and gonorrhea   Mental health conditions like depression and anxiety  Your doctor will talk to you about the different types of screening tests. They can help you decide which screenings to have. They can also explain what the results might mean.   Answering " questions - The physical is a good time to ask the doctor or nurse questions about your health. If needed, they can refer you to other doctors or specialists, too.  Adults older than 65 years often need other care, too. As you get older, your doctor will talk to you about:   How to prevent falling at home   Hearing or vision tests   Memory testing   How to take your medicines safely   Making sure that you have the help and support you need at home  All topics are updated as new evidence becomes available and our peer review process is complete.  This topic retrieved from [employer] on: May 02, 2024.  Topic 436568 Version 1.0  Release: 32.4.3 - C32.122  © 2024 UpToDate, Inc. and/or its affiliates. All rights reserved.  Consumer Information Use and Disclaimer   Disclaimer: This generalized information is a limited summary of diagnosis, treatment, and/or medication information. It is not meant to be comprehensive and should be used as a tool to help the user understand and/or assess potential diagnostic and treatment options. It does NOT include all information about conditions, treatments, medications, side effects, or risks that may apply to a specific patient. It is not intended to be medical advice or a substitute for the medical advice, diagnosis, or treatment of a health care provider based on the health care provider's examination and assessment of a patient's specific and unique circumstances. Patients must speak with a health care provider for complete information about their health, medical questions, and treatment options, including any risks or benefits regarding use of medications. This information does not endorse any treatments or medications as safe, effective, or approved for treating a specific patient. UpToDate, Inc. and its affiliates disclaim any warranty or liability relating to this information or the use thereof.The use of this information is governed by the Terms of Use, available at  https://www.woltersGLIIFuwer.com/en/know/clinical-effectiveness-terms. 2024© UpToDate, Inc. and its affiliates and/or licensors. All rights reserved.  Copyright   © 2024 UpToDate, Inc. and/or its affiliates. All rights reserved.  Check labs and take Vitamin D and check labs for hx of hot flashes and obesity. Call if any problems and see GYN as directed and get mammogram sand get colon screening when 45.     Assessment & Plan  Annual physical exam  Check labs and see GYN and get mammograms, get colon screenings as directed when 45  Orders:  •  TSH, 3rd generation; Future  •  T4, free; Future  •  Follicle stimulating hormone; Future  •  Luteinizing hormone; Future  •  Comprehensive metabolic panel; Future  •  CBC and differential; Future  •  Hemoglobin A1C; Future  •  Lipid Panel with Direct LDL reflex; Future  •  Cortisol; Future  •  Vitamin D 25 hydroxy; Future    Encounter for gynecological examination  See GYN as directed       Hot flashes  Check labs  Orders:  •  TSH, 3rd generation; Future  •  T4, free; Future  •  Follicle stimulating hormone; Future  •  Luteinizing hormone; Future  •  Cortisol; Future    Screening for hyperlipidemia  Check labs  Orders:  •  TSH, 3rd generation; Future  •  T4, free; Future  •  Comprehensive metabolic panel; Future  •  Lipid Panel with Direct LDL reflex; Future    Obesity (BMI 30-39.9)  Lose weight as directed to get BMI lower than 25    Orders:  •  TSH, 3rd generation; Future  •  T4, free; Future  •  Comprehensive metabolic panel; Future  •  Hemoglobin A1C; Future  •  Lipid Panel with Direct LDL reflex; Future    Vitamin D deficiency  Take Vitamin d3 as directed and check labs  Orders:  •  Comprehensive metabolic panel; Future  •  Vitamin D 25 hydroxy; Future        Preventive Screenings:    - Breast cancer screening: screening up-to-date     Immunizations:  - Immunizations due: Tdap         History of Present Illness     Adult Annual Physical:  Patient presents for annual  "physical.  and has 4 children, ages 20, 17, 15, 10. 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 Non smoker and also drinks alcohol on weekends 3 drinks per weekend. Uses sunscreen and monitors for ticks. Avoids processed foods and does strength training. Tries to get at least 8 hours sleep per night and patient does not sleep. May hve had some hot flashes and gained some weight. .     Diet and Physical Activity:  - Diet/Nutrition: no special diet.  - Exercise: 5-7 times a week on average, strength training exercises and moderate cardiovascular exercise.    Depression Screening:  - PHQ-2 Score: 0    General Health:  - Sleep: 7-8 hours of sleep on average and sleeps poorly.  - Hearing: normal hearing bilateral ears.  - Vision: wears glasses and most recent eye exam < 1 year ago.  - Dental: regular dental visits, brushes teeth twice daily and floss regularly.    /GYN Health:  - Follows with GYN: yes.   - Last menstrual cycle: 6/22/2025.   - History of STDs: no    Advanced Care Planning:  - Has an advanced directive?: no    - Has a durable medical POA?: no      Review of Systems   Constitutional: Negative.    HENT: Negative.     Eyes: Negative.    Respiratory: Negative.     Cardiovascular: Negative.    Gastrointestinal: Negative.    Endocrine: Negative.    Genitourinary: Negative.    Musculoskeletal: Negative.    Skin: Negative.    Allergic/Immunologic: Negative.    Neurological: Negative.    Hematological: Negative.    Psychiatric/Behavioral: Negative.       Medications Ordered Prior to Encounter[1]     Objective   /82 (BP Location: Left arm, Patient Position: Sitting, Cuff Size: Standard)   Pulse 86   Temp (!) 97 °F (36.1 °C) (Temporal)   Ht 5' 6.93\" (1.7 m)   Wt 88 kg (194 lb)   LMP 06/22/2025   SpO2 99%   BMI 30.45 kg/m²     Physical Exam  Constitutional:       Appearance: She is well-developed.   HENT:      Head: Normocephalic and atraumatic.      Right Ear: External ear " normal.      Left Ear: External ear normal.      Nose: Nose normal.     Eyes:      Conjunctiva/sclera: Conjunctivae normal.      Pupils: Pupils are equal, round, and reactive to light.       Cardiovascular:      Rate and Rhythm: Normal rate and regular rhythm.      Heart sounds: Normal heart sounds.   Pulmonary:      Effort: Pulmonary effort is normal.      Breath sounds: Normal breath sounds.     Musculoskeletal:         General: Normal range of motion.      Cervical back: Normal range of motion and neck supple.     Skin:     General: Skin is warm and dry.      Capillary Refill: Capillary refill takes less than 2 seconds.     Neurological:      General: No focal deficit present.      Mental Status: She is alert and oriented to person, place, and time. Mental status is at baseline.      Deep Tendon Reflexes: Reflexes are normal and symmetric.     Psychiatric:         Mood and Affect: Mood normal.         Behavior: Behavior normal.         Thought Content: Thought content normal.         Judgment: Judgment normal.       Administrative Statements   I have spent a total time of 33 minutes in caring for this patient on the day of the visit/encounter including Diagnostic results, Prognosis, Risks and benefits of tx options, Instructions for management, Patient and family education, Importance of tx compliance, Risk factor reductions, Impressions, Counseling / Coordination of care, Documenting in the medical record, Reviewing/placing orders in the medical record (including tests, medications, and/or procedures), and Obtaining or reviewing history  .         [1]  Current Outpatient Medications on File Prior to Visit   Medication Sig Dispense Refill   • Cyanocobalamin (VITAMIN B-12 PO) Take by mouth     • Multiple Vitamin (MULTIVITAMIN) tablet Take 1 tablet by mouth in the morning.     • VITAMIN D, CHOLECALCIFEROL, PO Take by mouth     • [DISCONTINUED] oxyCODONE (Roxicodone) 5 immediate release tablet Take 1 tablet (5 mg  total) by mouth every 6 (six) hours as needed for moderate pain for up to 15 doses Max Daily Amount: 20 mg (Patient not taking: Reported on 4/8/2024) 15 tablet 0   • [DISCONTINUED] pantoprazole (PROTONIX) 40 mg tablet Take 1 tablet (40 mg total) by mouth 2 (two) times a day before meals (Patient not taking: Reported on 11/25/2024) 60 tablet 0     No current facility-administered medications on file prior to visit.

## 2025-06-25 NOTE — ASSESSMENT & PLAN NOTE
Lose weight as directed to get BMI lower than 25    Orders:  •  TSH, 3rd generation; Future  •  T4, free; Future  •  Comprehensive metabolic panel; Future  •  Hemoglobin A1C; Future  •  Lipid Panel with Direct LDL reflex; Future   Cardiac catherization with possible intervention

## 2025-06-26 ENCOUNTER — APPOINTMENT (OUTPATIENT)
Dept: LAB | Facility: HOSPITAL | Age: 45
End: 2025-06-26
Attending: FAMILY MEDICINE
Payer: COMMERCIAL

## 2025-06-26 DIAGNOSIS — R23.2 HOT FLASHES: ICD-10-CM

## 2025-06-26 DIAGNOSIS — E66.9 OBESITY (BMI 30-39.9): ICD-10-CM

## 2025-06-26 DIAGNOSIS — Z00.00 ANNUAL PHYSICAL EXAM: ICD-10-CM

## 2025-06-26 DIAGNOSIS — Z13.220 SCREENING FOR HYPERLIPIDEMIA: ICD-10-CM

## 2025-06-26 DIAGNOSIS — E55.9 VITAMIN D DEFICIENCY: ICD-10-CM

## 2025-06-26 LAB
25(OH)D3 SERPL-MCNC: 43.5 NG/ML (ref 30–100)
ALBUMIN SERPL BCG-MCNC: 4.5 G/DL (ref 3.5–5)
ALP SERPL-CCNC: 55 U/L (ref 34–104)
ALT SERPL W P-5'-P-CCNC: 13 U/L (ref 7–52)
ANION GAP SERPL CALCULATED.3IONS-SCNC: 10 MMOL/L (ref 4–13)
AST SERPL W P-5'-P-CCNC: 17 U/L (ref 13–39)
BASOPHILS # BLD AUTO: 0.06 THOUSANDS/ÂΜL (ref 0–0.1)
BASOPHILS NFR BLD AUTO: 1 % (ref 0–1)
BILIRUB SERPL-MCNC: 0.57 MG/DL (ref 0.2–1)
BUN SERPL-MCNC: 16 MG/DL (ref 5–25)
CALCIUM SERPL-MCNC: 9.6 MG/DL (ref 8.4–10.2)
CHLORIDE SERPL-SCNC: 104 MMOL/L (ref 96–108)
CHOLEST SERPL-MCNC: 212 MG/DL (ref ?–200)
CO2 SERPL-SCNC: 25 MMOL/L (ref 21–32)
CORTIS SERPL-MCNC: 15.1 UG/DL
CREAT SERPL-MCNC: 1.05 MG/DL (ref 0.6–1.3)
EOSINOPHIL # BLD AUTO: 0.22 THOUSAND/ÂΜL (ref 0–0.61)
EOSINOPHIL NFR BLD AUTO: 3 % (ref 0–6)
ERYTHROCYTE [DISTWIDTH] IN BLOOD BY AUTOMATED COUNT: 13.4 % (ref 11.6–15.1)
EST. AVERAGE GLUCOSE BLD GHB EST-MCNC: 105 MG/DL
FSH SERPL-ACNC: 5 MIU/ML
GFR SERPL CREATININE-BSD FRML MDRD: 64 ML/MIN/1.73SQ M
GLUCOSE P FAST SERPL-MCNC: 88 MG/DL (ref 65–99)
HBA1C MFR BLD: 5.3 %
HCT VFR BLD AUTO: 42.7 % (ref 34.8–46.1)
HDLC SERPL-MCNC: 86 MG/DL
HGB BLD-MCNC: 13.9 G/DL (ref 11.5–15.4)
IMM GRANULOCYTES # BLD AUTO: 0.02 THOUSAND/UL (ref 0–0.2)
IMM GRANULOCYTES NFR BLD AUTO: 0 % (ref 0–2)
LDLC SERPL CALC-MCNC: 107 MG/DL (ref 0–100)
LH SERPL-ACNC: 5.4 MIU/ML
LYMPHOCYTES # BLD AUTO: 1.64 THOUSANDS/ÂΜL (ref 0.6–4.47)
LYMPHOCYTES NFR BLD AUTO: 25 % (ref 14–44)
MCH RBC QN AUTO: 30.5 PG (ref 26.8–34.3)
MCHC RBC AUTO-ENTMCNC: 32.6 G/DL (ref 31.4–37.4)
MCV RBC AUTO: 94 FL (ref 82–98)
MONOCYTES # BLD AUTO: 0.59 THOUSAND/ÂΜL (ref 0.17–1.22)
MONOCYTES NFR BLD AUTO: 9 % (ref 4–12)
NEUTROPHILS # BLD AUTO: 4.04 THOUSANDS/ÂΜL (ref 1.85–7.62)
NEUTS SEG NFR BLD AUTO: 62 % (ref 43–75)
NRBC BLD AUTO-RTO: 0 /100 WBCS
PLATELET # BLD AUTO: 233 THOUSANDS/UL (ref 149–390)
PMV BLD AUTO: 9.8 FL (ref 8.9–12.7)
POTASSIUM SERPL-SCNC: 4.7 MMOL/L (ref 3.5–5.3)
PROT SERPL-MCNC: 7.2 G/DL (ref 6.4–8.4)
RBC # BLD AUTO: 4.56 MILLION/UL (ref 3.81–5.12)
SODIUM SERPL-SCNC: 139 MMOL/L (ref 135–147)
T4 FREE SERPL-MCNC: 0.69 NG/DL (ref 0.61–1.12)
TRIGL SERPL-MCNC: 95 MG/DL (ref ?–150)
TSH SERPL DL<=0.05 MIU/L-ACNC: 2.42 UIU/ML (ref 0.45–4.5)
WBC # BLD AUTO: 6.57 THOUSAND/UL (ref 4.31–10.16)

## 2025-06-26 PROCEDURE — 85025 COMPLETE CBC W/AUTO DIFF WBC: CPT

## 2025-06-26 PROCEDURE — 82533 TOTAL CORTISOL: CPT

## 2025-06-26 PROCEDURE — 83001 ASSAY OF GONADOTROPIN (FSH): CPT

## 2025-06-26 PROCEDURE — 80053 COMPREHEN METABOLIC PANEL: CPT

## 2025-06-26 PROCEDURE — 84439 ASSAY OF FREE THYROXINE: CPT

## 2025-06-26 PROCEDURE — 84443 ASSAY THYROID STIM HORMONE: CPT

## 2025-06-26 PROCEDURE — 80061 LIPID PANEL: CPT

## 2025-06-26 PROCEDURE — 83002 ASSAY OF GONADOTROPIN (LH): CPT

## 2025-06-26 PROCEDURE — 36415 COLL VENOUS BLD VENIPUNCTURE: CPT

## 2025-06-26 PROCEDURE — 83036 HEMOGLOBIN GLYCOSYLATED A1C: CPT

## 2025-06-26 PROCEDURE — 82306 VITAMIN D 25 HYDROXY: CPT

## (undated) DEVICE — GLOVE SRG BIOGEL 6

## (undated) DEVICE — 3M™ MICROFOAM™ TAPE 1528-4: Brand: 3M™ MICROFOAM™

## (undated) DEVICE — 2000CC GUARDIAN II: Brand: GUARDIAN

## (undated) DEVICE — SUT MONOCRYL 4-0 PS-2 27 IN Y426H

## (undated) DEVICE — MEDI-VAC YANKAUER SUCTION HANDLE W/BULBOUS AND CONTROL VENT: Brand: CARDINAL HEALTH

## (undated) DEVICE — CHLORAPREP HI-LITE 26ML ORANGE

## (undated) DEVICE — SCD SEQUENTIAL COMPRESSION COMFORT SLEEVE MEDIUM KNEE LENGTH: Brand: KENDALL SCD

## (undated) DEVICE — INTENDED FOR TISSUE SEPARATION, AND OTHER PROCEDURES THAT REQUIRE A SHARP SURGICAL BLADE TO PUNCTURE OR CUT.: Brand: BARD-PARKER SAFETY BLADES SIZE 15, STERILE

## (undated) DEVICE — TUBING SUCTION 5MM X 12 FT

## (undated) DEVICE — PLUMEPEN PRO 10FT

## (undated) DEVICE — SUT VICRYL 2-0 SH 27 IN UNDYED J417H

## (undated) DEVICE — ADHESIVE SKIN HIGH VISCOSITY EXOFIN 1ML

## (undated) DEVICE — SUT MONOCRYL 3-0 SH 27 IN Y416H

## (undated) DEVICE — BETHLEHEM UNIVERSAL MINOR GEN: Brand: CARDINAL HEALTH

## (undated) DEVICE — DRAPE EQUIPMENT RF WAND